# Patient Record
Sex: FEMALE | Race: OTHER | NOT HISPANIC OR LATINO | Employment: OTHER | URBAN - METROPOLITAN AREA
[De-identification: names, ages, dates, MRNs, and addresses within clinical notes are randomized per-mention and may not be internally consistent; named-entity substitution may affect disease eponyms.]

---

## 2023-10-06 ENCOUNTER — HOSPITAL ENCOUNTER (EMERGENCY)
Facility: HOSPITAL | Age: 88
Discharge: HOME | End: 2023-10-07
Attending: EMERGENCY MEDICINE
Payer: MEDICARE

## 2023-10-06 ENCOUNTER — APPOINTMENT (OUTPATIENT)
Dept: RADIOLOGY | Facility: HOSPITAL | Age: 88
End: 2023-10-06
Payer: MEDICARE

## 2023-10-06 DIAGNOSIS — R11.2 NAUSEA AND VOMITING, UNSPECIFIED VOMITING TYPE: Primary | ICD-10-CM

## 2023-10-06 DIAGNOSIS — K40.90 INGUINAL HERNIA OF LEFT SIDE WITHOUT OBSTRUCTION OR GANGRENE: ICD-10-CM

## 2023-10-06 LAB
BASOPHILS # BLD AUTO: 0.02 X10*3/UL (ref 0–0.1)
BASOPHILS NFR BLD AUTO: 0.2 %
EOSINOPHIL # BLD AUTO: 0.2 X10*3/UL (ref 0–0.4)
EOSINOPHIL NFR BLD AUTO: 1.8 %
ERYTHROCYTE [DISTWIDTH] IN BLOOD BY AUTOMATED COUNT: 13.3 % (ref 11.5–14.5)
HCT VFR BLD AUTO: 32.2 % (ref 36–46)
HGB BLD-MCNC: 10 G/DL (ref 12–16)
IMM GRANULOCYTES # BLD AUTO: 0.04 X10*3/UL (ref 0–0.5)
IMM GRANULOCYTES NFR BLD AUTO: 0.4 % (ref 0–0.9)
LYMPHOCYTES # BLD AUTO: 0.8 X10*3/UL (ref 0.8–3)
LYMPHOCYTES NFR BLD AUTO: 7.3 %
MCH RBC QN AUTO: 28.2 PG (ref 26–34)
MCHC RBC AUTO-ENTMCNC: 31.1 G/DL (ref 32–36)
MCV RBC AUTO: 91 FL (ref 80–100)
MONOCYTES # BLD AUTO: 0.74 X10*3/UL (ref 0.05–0.8)
MONOCYTES NFR BLD AUTO: 6.7 %
NEUTROPHILS # BLD AUTO: 9.23 X10*3/UL (ref 1.6–5.5)
NEUTROPHILS NFR BLD AUTO: 83.6 %
NRBC BLD-RTO: 0 /100 WBCS (ref 0–0)
PLATELET # BLD AUTO: 222 X10*3/UL (ref 150–450)
PMV BLD AUTO: 10.9 FL (ref 7.5–11.5)
RBC # BLD AUTO: 3.55 X10*6/UL (ref 4–5.2)
WBC # BLD AUTO: 11 X10*3/UL (ref 4.4–11.3)

## 2023-10-06 PROCEDURE — 83690 ASSAY OF LIPASE: CPT | Performed by: EMERGENCY MEDICINE

## 2023-10-06 PROCEDURE — 99284 EMERGENCY DEPT VISIT MOD MDM: CPT | Performed by: EMERGENCY MEDICINE

## 2023-10-06 PROCEDURE — 99285 EMERGENCY DEPT VISIT HI MDM: CPT | Performed by: EMERGENCY MEDICINE

## 2023-10-06 PROCEDURE — 96361 HYDRATE IV INFUSION ADD-ON: CPT | Performed by: EMERGENCY MEDICINE

## 2023-10-06 PROCEDURE — 36415 COLL VENOUS BLD VENIPUNCTURE: CPT | Performed by: EMERGENCY MEDICINE

## 2023-10-06 PROCEDURE — 74177 CT ABD & PELVIS W/CONTRAST: CPT | Mod: MG

## 2023-10-06 PROCEDURE — 85025 COMPLETE CBC W/AUTO DIFF WBC: CPT | Performed by: EMERGENCY MEDICINE

## 2023-10-06 PROCEDURE — 74177 CT ABD & PELVIS W/CONTRAST: CPT | Performed by: RADIOLOGY

## 2023-10-06 PROCEDURE — 83735 ASSAY OF MAGNESIUM: CPT | Performed by: EMERGENCY MEDICINE

## 2023-10-06 PROCEDURE — 84075 ASSAY ALKALINE PHOSPHATASE: CPT | Performed by: EMERGENCY MEDICINE

## 2023-10-06 PROCEDURE — 96374 THER/PROPH/DIAG INJ IV PUSH: CPT | Performed by: EMERGENCY MEDICINE

## 2023-10-06 RX ORDER — ONDANSETRON HYDROCHLORIDE 2 MG/ML
4 INJECTION, SOLUTION INTRAVENOUS ONCE
Status: COMPLETED | OUTPATIENT
Start: 2023-10-06 | End: 2023-10-07

## 2023-10-06 ASSESSMENT — PAIN SCALES - GENERAL: PAINLEVEL_OUTOF10: 3

## 2023-10-06 ASSESSMENT — LIFESTYLE VARIABLES
HAVE YOU EVER FELT YOU SHOULD CUT DOWN ON YOUR DRINKING: NO
EVER HAD A DRINK FIRST THING IN THE MORNING TO STEADY YOUR NERVES TO GET RID OF A HANGOVER: NO
EVER FELT BAD OR GUILTY ABOUT YOUR DRINKING: NO
HAVE PEOPLE ANNOYED YOU BY CRITICIZING YOUR DRINKING: NO

## 2023-10-06 ASSESSMENT — COLUMBIA-SUICIDE SEVERITY RATING SCALE - C-SSRS
1. IN THE PAST MONTH, HAVE YOU WISHED YOU WERE DEAD OR WISHED YOU COULD GO TO SLEEP AND NOT WAKE UP?: NO
2. HAVE YOU ACTUALLY HAD ANY THOUGHTS OF KILLING YOURSELF?: NO
6. HAVE YOU EVER DONE ANYTHING, STARTED TO DO ANYTHING, OR PREPARED TO DO ANYTHING TO END YOUR LIFE?: NO

## 2023-10-06 ASSESSMENT — PAIN - FUNCTIONAL ASSESSMENT: PAIN_FUNCTIONAL_ASSESSMENT: 0-10

## 2023-10-07 VITALS
HEIGHT: 63 IN | OXYGEN SATURATION: 98 % | WEIGHT: 115.3 LBS | DIASTOLIC BLOOD PRESSURE: 49 MMHG | BODY MASS INDEX: 20.43 KG/M2 | TEMPERATURE: 97.9 F | SYSTOLIC BLOOD PRESSURE: 95 MMHG | RESPIRATION RATE: 18 BRPM | HEART RATE: 87 BPM

## 2023-10-07 LAB
ALBUMIN SERPL BCP-MCNC: 3.7 G/DL (ref 3.4–5)
ALP SERPL-CCNC: 70 U/L (ref 33–136)
ALT SERPL W P-5'-P-CCNC: 9 U/L (ref 7–45)
ANION GAP SERPL CALC-SCNC: 11 MMOL/L (ref 10–20)
AST SERPL W P-5'-P-CCNC: 22 U/L (ref 9–39)
BILIRUB SERPL-MCNC: 0.3 MG/DL (ref 0–1.2)
BUN SERPL-MCNC: 40 MG/DL (ref 6–23)
CALCIUM SERPL-MCNC: 8.9 MG/DL (ref 8.6–10.3)
CHLORIDE SERPL-SCNC: 100 MMOL/L (ref 98–107)
CO2 SERPL-SCNC: 30 MMOL/L (ref 21–32)
CREAT SERPL-MCNC: 0.73 MG/DL (ref 0.5–1.05)
GFR SERPL CREATININE-BSD FRML MDRD: 78 ML/MIN/1.73M*2
GLUCOSE SERPL-MCNC: 181 MG/DL (ref 74–99)
LIPASE SERPL-CCNC: 17 U/L (ref 9–82)
MAGNESIUM SERPL-MCNC: 1.91 MG/DL (ref 1.6–2.4)
POTASSIUM SERPL-SCNC: 5 MMOL/L (ref 3.5–5.3)
PROT SERPL-MCNC: 6.3 G/DL (ref 6.4–8.2)
SODIUM SERPL-SCNC: 136 MMOL/L (ref 136–145)

## 2023-10-07 PROCEDURE — 2500000004 HC RX 250 GENERAL PHARMACY W/ HCPCS (ALT 636 FOR OP/ED): Performed by: EMERGENCY MEDICINE

## 2023-10-07 PROCEDURE — 2550000001 HC RX 255 CONTRASTS: Performed by: EMERGENCY MEDICINE

## 2023-10-07 PROCEDURE — 96374 THER/PROPH/DIAG INJ IV PUSH: CPT | Mod: 59 | Performed by: EMERGENCY MEDICINE

## 2023-10-07 PROCEDURE — 74177 CT ABD & PELVIS W/CONTRAST: CPT | Mod: MG

## 2023-10-07 PROCEDURE — 96361 HYDRATE IV INFUSION ADD-ON: CPT | Performed by: EMERGENCY MEDICINE

## 2023-10-07 PROCEDURE — 2580000001 HC RX 258 IV SOLUTIONS: Performed by: EMERGENCY MEDICINE

## 2023-10-07 RX ADMIN — ONDANSETRON 4 MG: 2 INJECTION INTRAMUSCULAR; INTRAVENOUS at 00:11

## 2023-10-07 RX ADMIN — IOHEXOL 75 ML: 350 INJECTION, SOLUTION INTRAVENOUS at 00:42

## 2023-10-07 RX ADMIN — SODIUM CHLORIDE, POTASSIUM CHLORIDE, SODIUM LACTATE AND CALCIUM CHLORIDE 1000 ML: 600; 310; 30; 20 INJECTION, SOLUTION INTRAVENOUS at 00:10

## 2023-10-07 NOTE — ED PROVIDER NOTES
HPI   Chief Complaint   Patient presents with    Nausea    Diarrhea    Vomiting       89-year-old female presenting to the emergency department with abdominal pain vomiting and diarrhea.  Symptoms started about 9 PM.  She is initially just feeling nauseous.  Then had large episode of emesis and had a bout of diarrhea at the same time.  She was having pain before this, but has since resolved.  There is no blood in the vomit or coffee-ground emesis.  No blood in stools or black or tarry stools.  No fevers or chills.  No chest pain or trouble breathing.  No lightheadedness or syncope.  No dysuria hematuria urinary frequency.                          [place] Coma Scale Score: 10                  Patient History   Past Medical History:   Diagnosis Date    Personal history of other diseases of the circulatory system     History of hypertension    Personal history of other diseases of the digestive system     History of gastroesophageal reflux (GERD)    Personal history of other endocrine, nutritional and metabolic disease     History of high cholesterol     Past Surgical History:   Procedure Laterality Date    APPENDECTOMY  04/10/2018    Appendectomy    GALLBLADDER SURGERY  04/10/2018    Gallbladder Surgery    TOTAL KNEE ARTHROPLASTY  04/10/2018    Knee Replacement     No family history on file.  Social History     Tobacco Use    Smoking status: Not on file    Smokeless tobacco: Not on file   Substance Use Topics    Alcohol use: Not on file    Drug use: Not on file       Physical Exam   ED Triage Vitals [10/06/23 2218]   Temp Heart Rate Resp BP   36.6 °C (97.9 °F) 92 18 119/60      SpO2 Temp src Heart Rate Source Patient Position   96 % -- -- --      BP Location FiO2 (%)     -- --       Physical Exam  Vitals and nursing note reviewed.   Constitutional:       General: She is not in acute distress.     Appearance: Normal appearance. She is not ill-appearing.   HENT:      Head: Normocephalic and atraumatic.      Nose: Nose  normal.      Mouth/Throat:      Mouth: Mucous membranes are moist.   Eyes:      Extraocular Movements: Extraocular movements intact.      Conjunctiva/sclera: Conjunctivae normal.      Pupils: Pupils are equal, round, and reactive to light.   Cardiovascular:      Rate and Rhythm: Normal rate and regular rhythm.      Heart sounds: Normal heart sounds.   Pulmonary:      Effort: Pulmonary effort is normal.      Breath sounds: Normal breath sounds.   Abdominal:      General: Abdomen is flat. Bowel sounds are normal. There is no distension.      Palpations: Abdomen is soft.      Tenderness: There is no abdominal tenderness. There is no right CVA tenderness, left CVA tenderness, guarding or rebound.      Hernia: A hernia (left inguinal, reducible) is present.   Musculoskeletal:         General: Normal range of motion.      Cervical back: Normal range of motion and neck supple.   Skin:     General: Skin is warm and dry.   Neurological:      General: No focal deficit present.      Mental Status: She is alert and oriented to person, place, and time.   Psychiatric:         Mood and Affect: Mood normal.         Behavior: Behavior normal.         ED Course & MDM   Diagnoses as of 10/07/23 0235   Nausea and vomiting, unspecified vomiting type   Inguinal hernia of left side without obstruction or gangrene       Medical Decision Making  89-year-old female presenting to the emergency department with abdominal pain vomiting and diarrhea.  She had 1 episode of vomiting and diarrhea.  Abdominal pain resolved after the episode of vomiting and diarrhea.  She is afebrile hemodynamically stable here in the emergency department.  She is not having any pain and is in no distress.  Abdomen is soft and nontender.  However, given her age and unreliable abdominal exam we will plan to obtain blood work and get a CT scan of the abdomen pelvis.    Labs all reviewed, no leukocytosis.  Other labs reviewed.  CT scan shows left-sided inguinal hernia  without any evidence of obstruction or other acute intra-abdominal pathology.  This is present on examination, and is easily reducible.  No concern for incarcerated or strangulated hernia.    Patient still feeling well.  She was given oral challenge which she tolerated without any difficulty.  At this time, I feel patient is appropriate for discharge and outpatient management.  She is given general surgery to follow-up with respect to her inguinal hernia.  She is given strict return precautions.  Patient and daughter understand agree with stated plan and she is discharged home at this time.        Procedure  Procedures     Sergio Frazier, DO  10/07/23 0648

## 2023-10-16 ENCOUNTER — HOSPITAL ENCOUNTER (OUTPATIENT)
Dept: CARDIOLOGY | Facility: HOSPITAL | Age: 88
Discharge: HOME | End: 2023-10-16
Payer: MEDICARE

## 2023-10-16 LAB
ATRIAL RATE: 90 BPM
P AXIS: 69 DEGREES
P OFFSET: 186 MS
P ONSET: 150 MS
PR INTERVAL: 156 MS
Q ONSET: 228 MS
QRS COUNT: 15 BEATS
QRS DURATION: 68 MS
QT INTERVAL: 372 MS
QTC CALCULATION(BAZETT): 455 MS
QTC FREDERICIA: 425 MS
R AXIS: 9 DEGREES
T AXIS: 41 DEGREES
T OFFSET: 414 MS
VENTRICULAR RATE: 90 BPM

## 2023-10-16 PROCEDURE — 93005 ELECTROCARDIOGRAM TRACING: CPT

## 2023-10-18 ENCOUNTER — APPOINTMENT (OUTPATIENT)
Dept: RADIOLOGY | Facility: HOSPITAL | Age: 88
End: 2023-10-18
Payer: MEDICARE

## 2023-10-18 ENCOUNTER — HOSPITAL ENCOUNTER (EMERGENCY)
Facility: HOSPITAL | Age: 88
Discharge: HOME | End: 2023-10-19
Attending: EMERGENCY MEDICINE
Payer: MEDICARE

## 2023-10-18 DIAGNOSIS — R10.84 GENERALIZED ABDOMINAL PAIN: Primary | ICD-10-CM

## 2023-10-18 LAB
ALBUMIN SERPL BCP-MCNC: 4 G/DL (ref 3.4–5)
ALP SERPL-CCNC: 85 U/L (ref 33–136)
ALT SERPL W P-5'-P-CCNC: 10 U/L (ref 7–45)
ANION GAP SERPL CALC-SCNC: 11 MMOL/L (ref 10–20)
APPEARANCE UR: CLEAR
AST SERPL W P-5'-P-CCNC: 16 U/L (ref 9–39)
BASOPHILS # BLD AUTO: 0.01 X10*3/UL (ref 0–0.1)
BASOPHILS NFR BLD AUTO: 0.1 %
BILIRUB SERPL-MCNC: 0.2 MG/DL (ref 0–1.2)
BILIRUB UR STRIP.AUTO-MCNC: NEGATIVE MG/DL
BUN SERPL-MCNC: 36 MG/DL (ref 6–23)
CALCIUM SERPL-MCNC: 8.8 MG/DL (ref 8.6–10.3)
CHLORIDE SERPL-SCNC: 101 MMOL/L (ref 98–107)
CO2 SERPL-SCNC: 27 MMOL/L (ref 21–32)
COLOR UR: YELLOW
CREAT SERPL-MCNC: 0.81 MG/DL (ref 0.5–1.05)
EOSINOPHIL # BLD AUTO: 0.01 X10*3/UL (ref 0–0.4)
EOSINOPHIL NFR BLD AUTO: 0.1 %
ERYTHROCYTE [DISTWIDTH] IN BLOOD BY AUTOMATED COUNT: 13.2 % (ref 11.5–14.5)
GFR SERPL CREATININE-BSD FRML MDRD: 69 ML/MIN/1.73M*2
GLUCOSE SERPL-MCNC: 206 MG/DL (ref 74–99)
GLUCOSE UR STRIP.AUTO-MCNC: NEGATIVE MG/DL
HCT VFR BLD AUTO: 34.3 % (ref 36–46)
HGB BLD-MCNC: 10.7 G/DL (ref 12–16)
HOLD SPECIMEN: NORMAL
IMM GRANULOCYTES # BLD AUTO: 0.03 X10*3/UL (ref 0–0.5)
IMM GRANULOCYTES NFR BLD AUTO: 0.3 % (ref 0–0.9)
KETONES UR STRIP.AUTO-MCNC: NEGATIVE MG/DL
LEUKOCYTE ESTERASE UR QL STRIP.AUTO: NEGATIVE
LIPASE SERPL-CCNC: 14 U/L (ref 9–82)
LYMPHOCYTES # BLD AUTO: 0.48 X10*3/UL (ref 0.8–3)
LYMPHOCYTES NFR BLD AUTO: 4.3 %
MCH RBC QN AUTO: 27.6 PG (ref 26–34)
MCHC RBC AUTO-ENTMCNC: 31.2 G/DL (ref 32–36)
MCV RBC AUTO: 89 FL (ref 80–100)
MONOCYTES # BLD AUTO: 0.62 X10*3/UL (ref 0.05–0.8)
MONOCYTES NFR BLD AUTO: 5.5 %
NEUTROPHILS # BLD AUTO: 10.12 X10*3/UL (ref 1.6–5.5)
NEUTROPHILS NFR BLD AUTO: 89.7 %
NITRITE UR QL STRIP.AUTO: NEGATIVE
NRBC BLD-RTO: 0 /100 WBCS (ref 0–0)
PH UR STRIP.AUTO: 5 [PH]
PLATELET # BLD AUTO: 225 X10*3/UL (ref 150–450)
PMV BLD AUTO: 11 FL (ref 7.5–11.5)
POTASSIUM SERPL-SCNC: 4.4 MMOL/L (ref 3.5–5.3)
PROT SERPL-MCNC: 6.5 G/DL (ref 6.4–8.2)
PROT UR STRIP.AUTO-MCNC: NEGATIVE MG/DL
RBC # BLD AUTO: 3.87 X10*6/UL (ref 4–5.2)
RBC # UR STRIP.AUTO: NEGATIVE /UL
SODIUM SERPL-SCNC: 135 MMOL/L (ref 136–145)
SP GR UR STRIP.AUTO: 1.01
UROBILINOGEN UR STRIP.AUTO-MCNC: <2 MG/DL
WBC # BLD AUTO: 11.3 X10*3/UL (ref 4.4–11.3)

## 2023-10-18 PROCEDURE — 85025 COMPLETE CBC W/AUTO DIFF WBC: CPT | Performed by: EMERGENCY MEDICINE

## 2023-10-18 PROCEDURE — 99285 EMERGENCY DEPT VISIT HI MDM: CPT | Performed by: STUDENT IN AN ORGANIZED HEALTH CARE EDUCATION/TRAINING PROGRAM

## 2023-10-18 PROCEDURE — 2550000001 HC RX 255 CONTRASTS: Performed by: EMERGENCY MEDICINE

## 2023-10-18 PROCEDURE — 81003 URINALYSIS AUTO W/O SCOPE: CPT | Performed by: EMERGENCY MEDICINE

## 2023-10-18 PROCEDURE — 74177 CT ABD & PELVIS W/CONTRAST: CPT | Performed by: RADIOLOGY

## 2023-10-18 PROCEDURE — 99284 EMERGENCY DEPT VISIT MOD MDM: CPT | Performed by: EMERGENCY MEDICINE

## 2023-10-18 PROCEDURE — 99285 EMERGENCY DEPT VISIT HI MDM: CPT | Mod: 25

## 2023-10-18 PROCEDURE — 2500000004 HC RX 250 GENERAL PHARMACY W/ HCPCS (ALT 636 FOR OP/ED)

## 2023-10-18 PROCEDURE — 74022 RADEX COMPL AQT ABD SERIES: CPT | Performed by: RADIOLOGY

## 2023-10-18 PROCEDURE — 74177 CT ABD & PELVIS W/CONTRAST: CPT | Mod: MG

## 2023-10-18 PROCEDURE — 96374 THER/PROPH/DIAG INJ IV PUSH: CPT | Mod: 59

## 2023-10-18 PROCEDURE — 74022 RADEX COMPL AQT ABD SERIES: CPT | Mod: FY

## 2023-10-18 PROCEDURE — 83690 ASSAY OF LIPASE: CPT | Performed by: EMERGENCY MEDICINE

## 2023-10-18 PROCEDURE — 84075 ASSAY ALKALINE PHOSPHATASE: CPT | Performed by: EMERGENCY MEDICINE

## 2023-10-18 PROCEDURE — 36415 COLL VENOUS BLD VENIPUNCTURE: CPT | Performed by: EMERGENCY MEDICINE

## 2023-10-18 RX ORDER — ONDANSETRON HYDROCHLORIDE 2 MG/ML
4 INJECTION, SOLUTION INTRAVENOUS ONCE
Status: COMPLETED | OUTPATIENT
Start: 2023-10-18 | End: 2023-10-18

## 2023-10-18 RX ORDER — ONDANSETRON HYDROCHLORIDE 2 MG/ML
INJECTION, SOLUTION INTRAVENOUS
Status: COMPLETED
Start: 2023-10-18 | End: 2023-10-18

## 2023-10-18 RX ADMIN — ONDANSETRON 4 MG: 2 INJECTION INTRAMUSCULAR; INTRAVENOUS at 22:38

## 2023-10-18 RX ADMIN — IOHEXOL 75 ML: 350 INJECTION, SOLUTION INTRAVENOUS at 22:54

## 2023-10-18 RX ADMIN — ONDANSETRON HYDROCHLORIDE 4 MG: 2 INJECTION, SOLUTION INTRAVENOUS at 22:38

## 2023-10-18 ASSESSMENT — LIFESTYLE VARIABLES
HAVE PEOPLE ANNOYED YOU BY CRITICIZING YOUR DRINKING: NO
HAVE YOU EVER FELT YOU SHOULD CUT DOWN ON YOUR DRINKING: NO
EVER FELT BAD OR GUILTY ABOUT YOUR DRINKING: NO
EVER HAD A DRINK FIRST THING IN THE MORNING TO STEADY YOUR NERVES TO GET RID OF A HANGOVER: NO

## 2023-10-18 ASSESSMENT — PAIN DESCRIPTION - ONSET: ONSET: ONGOING

## 2023-10-18 ASSESSMENT — PAIN DESCRIPTION - FREQUENCY: FREQUENCY: CONSTANT/CONTINUOUS

## 2023-10-18 ASSESSMENT — PAIN DESCRIPTION - LOCATION: LOCATION: ABDOMEN

## 2023-10-18 ASSESSMENT — PAIN - FUNCTIONAL ASSESSMENT: PAIN_FUNCTIONAL_ASSESSMENT: 0-10

## 2023-10-18 ASSESSMENT — COLUMBIA-SUICIDE SEVERITY RATING SCALE - C-SSRS
6. HAVE YOU EVER DONE ANYTHING, STARTED TO DO ANYTHING, OR PREPARED TO DO ANYTHING TO END YOUR LIFE?: NO
1. IN THE PAST MONTH, HAVE YOU WISHED YOU WERE DEAD OR WISHED YOU COULD GO TO SLEEP AND NOT WAKE UP?: NO
2. HAVE YOU ACTUALLY HAD ANY THOUGHTS OF KILLING YOURSELF?: NO

## 2023-10-18 ASSESSMENT — PAIN DESCRIPTION - PAIN TYPE: TYPE: ACUTE PAIN

## 2023-10-18 ASSESSMENT — PAIN DESCRIPTION - ORIENTATION: ORIENTATION: LOWER

## 2023-10-18 ASSESSMENT — PAIN DESCRIPTION - DESCRIPTORS
DESCRIPTORS: TENDER
DESCRIPTORS: DISCOMFORT

## 2023-10-18 ASSESSMENT — PAIN SCALES - GENERAL: PAINLEVEL_OUTOF10: 7

## 2023-10-19 ENCOUNTER — HOSPITAL ENCOUNTER (OUTPATIENT)
Dept: CARDIOLOGY | Facility: HOSPITAL | Age: 88
Discharge: HOME | End: 2023-10-19
Payer: MEDICARE

## 2023-10-19 VITALS
DIASTOLIC BLOOD PRESSURE: 55 MMHG | SYSTOLIC BLOOD PRESSURE: 113 MMHG | RESPIRATION RATE: 16 BRPM | OXYGEN SATURATION: 95 % | WEIGHT: 130 LBS | TEMPERATURE: 98.1 F | BODY MASS INDEX: 25.52 KG/M2 | HEIGHT: 60 IN | HEART RATE: 74 BPM

## 2023-10-19 LAB
ATRIAL RATE: 93 BPM
P AXIS: 79 DEGREES
P OFFSET: 181 MS
P ONSET: 144 MS
PR INTERVAL: 166 MS
Q ONSET: 227 MS
QRS COUNT: 16 BEATS
QRS DURATION: 72 MS
QT INTERVAL: 382 MS
QTC CALCULATION(BAZETT): 474 MS
QTC FREDERICIA: 442 MS
R AXIS: 3 DEGREES
T AXIS: 28 DEGREES
T OFFSET: 418 MS
VENTRICULAR RATE: 93 BPM

## 2023-10-19 PROCEDURE — 96375 TX/PRO/DX INJ NEW DRUG ADDON: CPT

## 2023-10-19 PROCEDURE — 2500000004 HC RX 250 GENERAL PHARMACY W/ HCPCS (ALT 636 FOR OP/ED)

## 2023-10-19 PROCEDURE — 93010 ELECTROCARDIOGRAM REPORT: CPT | Performed by: INTERNAL MEDICINE

## 2023-10-19 PROCEDURE — 93005 ELECTROCARDIOGRAM TRACING: CPT

## 2023-10-19 RX ORDER — MORPHINE SULFATE 2 MG/ML
2 INJECTION, SOLUTION INTRAMUSCULAR; INTRAVENOUS ONCE
Status: COMPLETED | OUTPATIENT
Start: 2023-10-19 | End: 2023-10-19

## 2023-10-19 RX ADMIN — MORPHINE SULFATE 2 MG: 2 INJECTION, SOLUTION INTRAMUSCULAR; INTRAVENOUS at 00:56

## 2023-10-19 ASSESSMENT — PAIN SCALES - GENERAL
PAINLEVEL_OUTOF10: 4
PAINLEVEL_OUTOF10: 6

## 2023-10-19 NOTE — PROGRESS NOTES
Natacha Ragsdale was handed off to me from the previous provided, Dr. Derek Ortez, at end of shift.  Please see previous provider's note for detailed H&P, MDM and prior ED course.     Of Note: Patient is non-english speaking, daughter present at bedside is assisting with interpretation and translation.     In brief, Natacha Ragsdale is an 89 y.o. female presenting from SNF with acute lower abdominal pain and multiple episodes of liquid, non-bloody diarrhea since this morning. Prior to symptom onset, patient had been constipated and unable to defect for the last 5 days. According to triage note was given a stool softener by NH staff. She was still passing flatus. Her history of liquids stool without solid BM preceded by period of constipation was most concerning for acute obstruction. Initial physical exam revealed TTP over RLQ and LLQ as well as presence of external hemorrhoids without impacted stool within rectal vault. She was medicated with zofran 4mg IV for symptomatic support. Diagnostic work-up included basic belly labs, negative UA and an acute abdominal XR series which revealed air-filled dilated small bowel throughout with nonspecific gas patterns. Labs were significant for glucose 206 and corrected sodium of 137 and stable anemia. Lipase was normal. EKG obtained at 2008 was nonischemic with sinus rhythm at 93bpm, normal LA interval and QTc.   At time of sign-out, results of CT abdomen pelvis are still pending. However, given her multiple risk factors and clinical presentation, previous provider did anticipate/believe that patient would benefit from in-patient/hospital admission.    Whilst under my care, patient has been tolerating PO intake and remained hemodynamically stable. On my reassessment, she was still experiencing bilateral lower abdominal pain and cramping. No longer nauseated. Denied any other associated systemic symptoms including fever, chills, chest pain/pressure, palpitations, dyspnea,  urinary symptoms. She has not had any further episodes of diarrhea but denies history of melena or BRBPR. Repeat abdominal exam was benign with no distention, rigidity or abnormal bowel sounds. She did continue to exhibit mild tenderness over diffuse lower abdomen but nothing localized. She was therefore given 2 mg Morphine with complete resolution of her abdominal pain.  CT scan of abdomen and pelvis showed moderate stool burden in the colon suggestive of constipation as well as stool ball within rectum with wall thickening and adjacent stranding suggestive for stercoral colitis. No evidence of acute obstruction, dilated bowel loops or presence of free fluid. Final radiology report noted left inguinal hernia containing small bowel loop without evidence of obstruction. There was also an ascending thoracic aorta aneurysm measures up to 4.1 cm found incidentally on CT imaging.    Patient was informed about the above CT imaging results and voiced understanding. She is feeling better and daughter feels comfortable with patient being discharged back to her SNF. Given that patient has remain stable, abdomen benign and she is continuing to have bowel movements, I believe she is appropriate for safe discharge home. She was advised to follow-up with her PCP in 2-3 days and increased daily fluid intake. Nursing home will continue bowel regiment to prevent further consitpation. ED return precautions were discussed in detail.    ED Course as of 10/19/23 0259   Wed Oct 18, 2023   2050 Lipase  Lipase is normal [GB]   2050 Comprehensive Metabolic Panel(!)  Comprehensive is unremarkable other than a slightly elevated glucose of 206 but normal BUN and creatinine along with liver function test. [GB]   2051 CBC with Differential(!)  CBC shows a normal white blood cell count low suspicion for infection.  Stable H&H for anemia at 10 and 34. [GB]   2051 ECG 12 lead  EKG at 2008 reveals a sinus rhythm at 93 bpm.  The MA interval and QTc  of are both normal.  Low voltage criteria is present.  No acute ST segment elevations. [GB]   Thu Oct 19, 2023   0032 2 morphine given for pain. CT abd/pelvis was performed, dispo will ultimately depend on results however given multiple risk factors and clinical presentation, prior Dr. Ortez does feel patient would be appropriate for admission. [MV]      ED Course User Index  [GB] Lui Zapata DO  [MV] Malini Ty PA-C         Diagnoses as of 10/19/23 0259   Generalized abdominal pain       Diagnosis: Generalized abdominal pain  Pt Disposition: Discharged in stable condition.

## 2023-10-19 NOTE — ED PROVIDER NOTES
HPI   Chief Complaint   Patient presents with    Abdominal Pain     Pt was constipated for 5 days, was given stool softeners at the SNF and now pt has diarrhea and lower abd pain        89-year-old female presenting with constipation of 5-day onset she is accompanied by her daughter who is also acting as .  Patient initially felt constipation 5 days ago and has not had a bowel movement until today with multiple liquid stools, but no solid BM.  At 4 AM today she has been starting to have lower abdominal pain.    Denies fever, chills, vomiting, chest pain, palpitations, shortness of breath, cough, abdominal pain, urinary changes, hematuria, dysuria, changes in stool, hematochezia, melena.                            [place] Coma Scale Score: 14                  Patient History   Past Medical History:   Diagnosis Date    Personal history of other diseases of the circulatory system     History of hypertension    Personal history of other diseases of the digestive system     History of gastroesophageal reflux (GERD)    Personal history of other endocrine, nutritional and metabolic disease     History of high cholesterol     Past Surgical History:   Procedure Laterality Date    APPENDECTOMY  04/10/2018    Appendectomy    GALLBLADDER SURGERY  04/10/2018    Gallbladder Surgery    TOTAL KNEE ARTHROPLASTY  04/10/2018    Knee Replacement     No family history on file.  Social History     Tobacco Use    Smoking status: Not on file    Smokeless tobacco: Not on file   Substance Use Topics    Alcohol use: Not on file    Drug use: Not on file       Physical Exam   ED Triage Vitals [10/18/23 1924]   Temp Heart Rate Resp BP   36.4 °C (97.5 °F) 94 18 124/60      SpO2 Temp Source Heart Rate Source Patient Position   97 % Temporal Monitor Sitting      BP Location FiO2 (%)     Right arm --       Physical Exam  Vitals reviewed.   Constitutional:       General: She is not in acute distress.     Appearance: Normal appearance. She  is not ill-appearing, toxic-appearing or diaphoretic.   HENT:      Head: Normocephalic and atraumatic.      Right Ear: External ear normal.      Left Ear: External ear normal.      Nose: Nose normal.      Mouth/Throat:      Mouth: Mucous membranes are moist.      Pharynx: Oropharynx is clear.   Eyes:      Extraocular Movements: Extraocular movements intact.      Pupils: Pupils are equal, round, and reactive to light.   Cardiovascular:      Rate and Rhythm: Normal rate and regular rhythm.      Pulses: Normal pulses.      Heart sounds: Normal heart sounds. No murmur heard.     No friction rub. No gallop.   Pulmonary:      Effort: Pulmonary effort is normal. No respiratory distress.      Breath sounds: Normal breath sounds. No wheezing, rhonchi or rales.   Abdominal:      General: Abdomen is flat. Bowel sounds are normal. There is no distension.      Palpations: Abdomen is soft. There is no mass.      Tenderness: There is abdominal tenderness.      Hernia: No hernia is present.      Comments: Tenderness in RLQ and LLQ on palpation.   Genitourinary:     Rectum: External hemorrhoid present. No mass or tenderness. Normal anal tone.      Comments: Rectal exam: Anal sphincter tone patent, rectal vault without stool ball palpable on digital exam.  Liquid stool present after exam.  Musculoskeletal:         General: Normal range of motion.      Cervical back: Normal range of motion and neck supple.   Skin:     General: Skin is warm and dry.      Capillary Refill: Capillary refill takes less than 2 seconds.   Neurological:      General: No focal deficit present.      Mental Status: She is alert and oriented to person, place, and time. Mental status is at baseline.   Psychiatric:         Mood and Affect: Mood normal.         Behavior: Behavior normal.         ED Course & MDM   ED Course as of 11/03/23 0941   Wed Oct 18, 2023   2050 Lipase  Lipase is normal [GB]   2050 Comprehensive Metabolic Panel(!)  Comprehensive is  unremarkable other than a slightly elevated glucose of 206 but normal BUN and creatinine along with liver function test. [GB]   2051 CBC with Differential(!)  CBC shows a normal white blood cell count low suspicion for infection.  Stable H&H for anemia at 10 and 34. [GB]   2051 ECG 12 lead  EKG at 2008 reveals a sinus rhythm at 93 bpm.  The OK interval and QTc of are both normal.  Low voltage criteria is present.  No acute ST segment elevations. [GB]   u Oct 19, 2023   0032 2 morphine given for pain. CT abd/pelvis was performed, dispo will ultimately depend on results however given multiple risk factors and clinical presentation, prior Dr. Ortez does feel patient would be appropriate for admission. [MV]      ED Course User Index  [GB] Lui Zapata DO  [MV] Malini Ty PA-C         Diagnoses as of 11/03/23 0941   Generalized abdominal pain       Medical Decision Making  89-year-old female presenting with constipation concerning for obstruction given history of liquid stools without solid bowel movement.  Work-up significant for Negative UA, lipase 14, glucose 206, sodium 137 when corrected for glucose, rest of CMP unremarkable, WBC 11.3, hemoglobin 10.7, platelets 225, acute abdominal series significant for air-filled dilated small bowel throughout abdomen and nonspecific all past pattern and no acute cardiopulmonary process.  X-ray results concerning CT obtained, pending read from radiology and patient transferred to physician assistant Malini Ty.        Procedure  Procedures     Kyle Henry DO  11/03/23 0941

## 2023-12-02 ENCOUNTER — HOSPITAL ENCOUNTER (OUTPATIENT)
Facility: HOSPITAL | Age: 88
Setting detail: OBSERVATION
Discharge: NURSING, REHAB OR LONG-TERM CARE | End: 2023-12-04
Attending: STUDENT IN AN ORGANIZED HEALTH CARE EDUCATION/TRAINING PROGRAM | Admitting: OBSTETRICS & GYNECOLOGY
Payer: MEDICARE

## 2023-12-02 ENCOUNTER — APPOINTMENT (OUTPATIENT)
Dept: RADIOLOGY | Facility: HOSPITAL | Age: 88
End: 2023-12-02
Payer: MEDICARE

## 2023-12-02 DIAGNOSIS — N76.4 CELLULITIS OF FEMALE GENITALIA: ICD-10-CM

## 2023-12-02 DIAGNOSIS — N30.00 ACUTE CYSTITIS WITHOUT HEMATURIA: Primary | ICD-10-CM

## 2023-12-02 DIAGNOSIS — N76.2 VULVAR CELLULITIS: ICD-10-CM

## 2023-12-02 LAB
ALBUMIN SERPL BCP-MCNC: 4.1 G/DL (ref 3.4–5)
ALP SERPL-CCNC: 65 U/L (ref 33–136)
ALT SERPL W P-5'-P-CCNC: 11 U/L (ref 7–45)
ANION GAP SERPL CALC-SCNC: 13 MMOL/L (ref 10–20)
APPEARANCE UR: CLEAR
AST SERPL W P-5'-P-CCNC: 27 U/L (ref 9–39)
BACTERIA #/AREA URNS AUTO: ABNORMAL /HPF
BASOPHILS # BLD AUTO: 0.02 X10*3/UL (ref 0–0.1)
BASOPHILS NFR BLD AUTO: 0.2 %
BILIRUB SERPL-MCNC: 0.4 MG/DL (ref 0–1.2)
BILIRUB UR STRIP.AUTO-MCNC: NEGATIVE MG/DL
BUN SERPL-MCNC: 32 MG/DL (ref 6–23)
CALCIUM SERPL-MCNC: 9.2 MG/DL (ref 8.6–10.3)
CHLORIDE SERPL-SCNC: 99 MMOL/L (ref 98–107)
CO2 SERPL-SCNC: 27 MMOL/L (ref 21–32)
COLOR UR: YELLOW
CREAT SERPL-MCNC: 0.8 MG/DL (ref 0.5–1.05)
EOSINOPHIL # BLD AUTO: 0.06 X10*3/UL (ref 0–0.4)
EOSINOPHIL NFR BLD AUTO: 0.5 %
ERYTHROCYTE [DISTWIDTH] IN BLOOD BY AUTOMATED COUNT: 14.5 % (ref 11.5–14.5)
GFR SERPL CREATININE-BSD FRML MDRD: 70 ML/MIN/1.73M*2
GLUCOSE SERPL-MCNC: 146 MG/DL (ref 74–99)
GLUCOSE UR STRIP.AUTO-MCNC: NEGATIVE MG/DL
HCT VFR BLD AUTO: 34 % (ref 36–46)
HGB BLD-MCNC: 10.3 G/DL (ref 12–16)
HOLD SPECIMEN: NORMAL
IMM GRANULOCYTES # BLD AUTO: 0.05 X10*3/UL (ref 0–0.5)
IMM GRANULOCYTES NFR BLD AUTO: 0.4 % (ref 0–0.9)
KETONES UR STRIP.AUTO-MCNC: NEGATIVE MG/DL
LACTATE SERPL-SCNC: 1.9 MMOL/L (ref 0.4–2)
LEUKOCYTE ESTERASE UR QL STRIP.AUTO: ABNORMAL
LIPASE SERPL-CCNC: 14 U/L (ref 9–82)
LYMPHOCYTES # BLD AUTO: 1.48 X10*3/UL (ref 0.8–3)
LYMPHOCYTES NFR BLD AUTO: 11.2 %
MAGNESIUM SERPL-MCNC: 2.23 MG/DL (ref 1.6–2.4)
MCH RBC QN AUTO: 25.2 PG (ref 26–34)
MCHC RBC AUTO-ENTMCNC: 30.3 G/DL (ref 32–36)
MCV RBC AUTO: 83 FL (ref 80–100)
MONOCYTES # BLD AUTO: 1.02 X10*3/UL (ref 0.05–0.8)
MONOCYTES NFR BLD AUTO: 7.7 %
NEUTROPHILS # BLD AUTO: 10.62 X10*3/UL (ref 1.6–5.5)
NEUTROPHILS NFR BLD AUTO: 80 %
NITRITE UR QL STRIP.AUTO: POSITIVE
NRBC BLD-RTO: 0 /100 WBCS (ref 0–0)
PH UR STRIP.AUTO: 5 [PH]
PHOSPHATE SERPL-MCNC: 4.4 MG/DL (ref 2.5–4.9)
PLATELET # BLD AUTO: 229 X10*3/UL (ref 150–450)
POTASSIUM SERPL-SCNC: 5.4 MMOL/L (ref 3.5–5.3)
PROT SERPL-MCNC: 7.3 G/DL (ref 6.4–8.2)
PROT UR STRIP.AUTO-MCNC: NEGATIVE MG/DL
RBC # BLD AUTO: 4.08 X10*6/UL (ref 4–5.2)
RBC # UR STRIP.AUTO: ABNORMAL /UL
RBC #/AREA URNS AUTO: ABNORMAL /HPF
SODIUM SERPL-SCNC: 134 MMOL/L (ref 136–145)
SP GR UR STRIP.AUTO: 1.01
UROBILINOGEN UR STRIP.AUTO-MCNC: <2 MG/DL
WBC # BLD AUTO: 13.3 X10*3/UL (ref 4.4–11.3)
WBC #/AREA URNS AUTO: ABNORMAL /HPF

## 2023-12-02 PROCEDURE — 84100 ASSAY OF PHOSPHORUS: CPT

## 2023-12-02 PROCEDURE — 99285 EMERGENCY DEPT VISIT HI MDM: CPT | Performed by: STUDENT IN AN ORGANIZED HEALTH CARE EDUCATION/TRAINING PROGRAM

## 2023-12-02 PROCEDURE — 94760 N-INVAS EAR/PLS OXIMETRY 1: CPT

## 2023-12-02 PROCEDURE — 96361 HYDRATE IV INFUSION ADD-ON: CPT

## 2023-12-02 PROCEDURE — 2500000004 HC RX 250 GENERAL PHARMACY W/ HCPCS (ALT 636 FOR OP/ED)

## 2023-12-02 PROCEDURE — 74177 CT ABD & PELVIS W/CONTRAST: CPT

## 2023-12-02 PROCEDURE — 85025 COMPLETE CBC W/AUTO DIFF WBC: CPT

## 2023-12-02 PROCEDURE — 36415 COLL VENOUS BLD VENIPUNCTURE: CPT

## 2023-12-02 PROCEDURE — 81001 URINALYSIS AUTO W/SCOPE: CPT

## 2023-12-02 PROCEDURE — 80053 COMPREHEN METABOLIC PANEL: CPT

## 2023-12-02 PROCEDURE — 74177 CT ABD & PELVIS W/CONTRAST: CPT | Mod: FOREIGN READ | Performed by: RADIOLOGY

## 2023-12-02 PROCEDURE — 83735 ASSAY OF MAGNESIUM: CPT

## 2023-12-02 PROCEDURE — 83690 ASSAY OF LIPASE: CPT

## 2023-12-02 PROCEDURE — 83605 ASSAY OF LACTIC ACID: CPT

## 2023-12-02 PROCEDURE — 87086 URINE CULTURE/COLONY COUNT: CPT | Mod: STJLAB

## 2023-12-02 PROCEDURE — 2550000001 HC RX 255 CONTRASTS: Performed by: STUDENT IN AN ORGANIZED HEALTH CARE EDUCATION/TRAINING PROGRAM

## 2023-12-02 RX ORDER — DIPHENHYDRAMINE HYDROCHLORIDE 50 MG/ML
25 INJECTION INTRAMUSCULAR; INTRAVENOUS ONCE
Status: COMPLETED | OUTPATIENT
Start: 2023-12-02 | End: 2023-12-02

## 2023-12-02 RX ORDER — CEFTRIAXONE 1 G/50ML
1 INJECTION, SOLUTION INTRAVENOUS ONCE
Status: DISCONTINUED | OUTPATIENT
Start: 2023-12-02 | End: 2023-12-03

## 2023-12-02 RX ORDER — DIPHENHYDRAMINE HYDROCHLORIDE 50 MG/ML
INJECTION INTRAMUSCULAR; INTRAVENOUS
Status: COMPLETED
Start: 2023-12-02 | End: 2023-12-02

## 2023-12-02 RX ORDER — ONDANSETRON HYDROCHLORIDE 2 MG/ML
4 INJECTION, SOLUTION INTRAVENOUS ONCE
Status: COMPLETED | OUTPATIENT
Start: 2023-12-02 | End: 2023-12-02

## 2023-12-02 RX ORDER — MORPHINE SULFATE 2 MG/ML
2 INJECTION, SOLUTION INTRAMUSCULAR; INTRAVENOUS ONCE
Status: COMPLETED | OUTPATIENT
Start: 2023-12-02 | End: 2023-12-02

## 2023-12-02 RX ADMIN — DIPHENHYDRAMINE HYDROCHLORIDE 25 MG: 50 INJECTION, SOLUTION INTRAMUSCULAR; INTRAVENOUS at 21:06

## 2023-12-02 RX ADMIN — IOHEXOL 75 ML: 350 INJECTION, SOLUTION INTRAVENOUS at 21:55

## 2023-12-02 RX ADMIN — ONDANSETRON 4 MG: 2 INJECTION INTRAMUSCULAR; INTRAVENOUS at 20:55

## 2023-12-02 RX ADMIN — MORPHINE SULFATE 2 MG: 2 INJECTION, SOLUTION INTRAMUSCULAR; INTRAVENOUS at 20:55

## 2023-12-02 RX ADMIN — SODIUM CHLORIDE 500 ML: 9 INJECTION, SOLUTION INTRAVENOUS at 20:55

## 2023-12-02 RX ADMIN — DIPHENHYDRAMINE HYDROCHLORIDE 25 MG: 50 INJECTION INTRAMUSCULAR; INTRAVENOUS at 21:06

## 2023-12-02 ASSESSMENT — PAIN - FUNCTIONAL ASSESSMENT
PAIN_FUNCTIONAL_ASSESSMENT: 0-10

## 2023-12-02 ASSESSMENT — PAIN DESCRIPTION - DESCRIPTORS: DESCRIPTORS: BURNING

## 2023-12-02 ASSESSMENT — PAIN SCALES - GENERAL
PAINLEVEL_OUTOF10: 2
PAINLEVEL_OUTOF10: 8

## 2023-12-02 ASSESSMENT — LIFESTYLE VARIABLES
HAVE YOU EVER FELT YOU SHOULD CUT DOWN ON YOUR DRINKING: NO
EVER FELT BAD OR GUILTY ABOUT YOUR DRINKING: NO
REASON UNABLE TO ASSESS: NO
HAVE PEOPLE ANNOYED YOU BY CRITICIZING YOUR DRINKING: NO
EVER HAD A DRINK FIRST THING IN THE MORNING TO STEADY YOUR NERVES TO GET RID OF A HANGOVER: NO

## 2023-12-02 ASSESSMENT — PAIN DESCRIPTION - PAIN TYPE: TYPE: ACUTE PAIN

## 2023-12-02 ASSESSMENT — PAIN DESCRIPTION - LOCATION: LOCATION: ABDOMEN

## 2023-12-03 PROBLEM — N30.00 ACUTE CYSTITIS: Status: ACTIVE | Noted: 2020-10-23

## 2023-12-03 PROBLEM — D64.9 NORMOCYTIC ANEMIA: Status: ACTIVE | Noted: 2023-12-03

## 2023-12-03 PROBLEM — H60.62 CHRONIC OTITIS EXTERNA OF LEFT EAR: Status: RESOLVED | Noted: 2023-12-03 | Resolved: 2023-12-03

## 2023-12-03 PROBLEM — K56.7 ILEUS (MULTI): Status: RESOLVED | Noted: 2023-01-30 | Resolved: 2023-12-03

## 2023-12-03 PROBLEM — S29.9XXA TRAUMATIC INJURY OF RIB: Status: RESOLVED | Noted: 2022-09-03 | Resolved: 2023-12-03

## 2023-12-03 PROBLEM — G89.29 CHRONIC RIGHT SHOULDER PAIN: Status: ACTIVE | Noted: 2017-11-10

## 2023-12-03 PROBLEM — K21.9 GERD (GASTROESOPHAGEAL REFLUX DISEASE): Status: ACTIVE | Noted: 2023-01-06

## 2023-12-03 PROBLEM — N30.00 ACUTE CYSTITIS: Status: ACTIVE | Noted: 2023-12-03

## 2023-12-03 PROBLEM — E78.5 HLD (HYPERLIPIDEMIA): Status: ACTIVE | Noted: 2023-01-06

## 2023-12-03 PROBLEM — M25.511 CHRONIC RIGHT SHOULDER PAIN: Status: ACTIVE | Noted: 2017-11-10

## 2023-12-03 PROBLEM — K12.0 RECURRENT APHTHOUS STOMATITIS: Status: ACTIVE | Noted: 2023-12-03

## 2023-12-03 PROBLEM — N76.2 VULVAR CELLULITIS: Status: ACTIVE | Noted: 2023-12-03

## 2023-12-03 PROBLEM — E11.9 DIABETES (MULTI): Status: RESOLVED | Noted: 2020-05-06 | Resolved: 2023-12-03

## 2023-12-03 PROBLEM — E11.9 CONTROLLED TYPE 2 DIABETES MELLITUS WITHOUT COMPLICATION, WITHOUT LONG-TERM CURRENT USE OF INSULIN (MULTI): Status: ACTIVE | Noted: 2023-01-06

## 2023-12-03 PROBLEM — N30.00 ACUTE CYSTITIS: Chronic | Status: ACTIVE | Noted: 2023-12-03

## 2023-12-03 PROBLEM — M97.9XXA PERIPROSTHETIC FRACTURE AROUND INTERNAL PROSTHETIC JOINT: Status: ACTIVE | Noted: 2023-08-26

## 2023-12-03 PROBLEM — L43.9 LICHEN PLANUS ATROPHICUS: Status: RESOLVED | Noted: 2023-12-03 | Resolved: 2023-12-03

## 2023-12-03 LAB
ALBUMIN SERPL BCP-MCNC: 4 G/DL (ref 3.4–5)
ALP SERPL-CCNC: 64 U/L (ref 33–136)
ALT SERPL W P-5'-P-CCNC: 8 U/L (ref 7–45)
ANION GAP SERPL CALC-SCNC: 10 MMOL/L (ref 10–20)
AST SERPL W P-5'-P-CCNC: 11 U/L (ref 9–39)
BILIRUB SERPL-MCNC: 0.7 MG/DL (ref 0–1.2)
BUN SERPL-MCNC: 20 MG/DL (ref 6–23)
CALCIUM SERPL-MCNC: 9 MG/DL (ref 8.6–10.3)
CHLORIDE SERPL-SCNC: 103 MMOL/L (ref 98–107)
CO2 SERPL-SCNC: 28 MMOL/L (ref 21–32)
CREAT SERPL-MCNC: 0.67 MG/DL (ref 0.5–1.05)
ERYTHROCYTE [DISTWIDTH] IN BLOOD BY AUTOMATED COUNT: 14.5 % (ref 11.5–14.5)
GFR SERPL CREATININE-BSD FRML MDRD: 83 ML/MIN/1.73M*2
GLUCOSE SERPL-MCNC: 186 MG/DL (ref 74–99)
HCT VFR BLD AUTO: 34.5 % (ref 36–46)
HGB BLD-MCNC: 10.4 G/DL (ref 12–16)
MCH RBC QN AUTO: 25.2 PG (ref 26–34)
MCHC RBC AUTO-ENTMCNC: 30.1 G/DL (ref 32–36)
MCV RBC AUTO: 84 FL (ref 80–100)
NRBC BLD-RTO: 0 /100 WBCS (ref 0–0)
PLATELET # BLD AUTO: 240 X10*3/UL (ref 150–450)
POTASSIUM SERPL-SCNC: 4.1 MMOL/L (ref 3.5–5.3)
PROT SERPL-MCNC: 6.7 G/DL (ref 6.4–8.2)
RBC # BLD AUTO: 4.13 X10*6/UL (ref 4–5.2)
SODIUM SERPL-SCNC: 137 MMOL/L (ref 136–145)
WBC # BLD AUTO: 13.3 X10*3/UL (ref 4.4–11.3)

## 2023-12-03 PROCEDURE — 96375 TX/PRO/DX INJ NEW DRUG ADDON: CPT

## 2023-12-03 PROCEDURE — 36415 COLL VENOUS BLD VENIPUNCTURE: CPT | Performed by: OBSTETRICS & GYNECOLOGY

## 2023-12-03 PROCEDURE — 2500000001 HC RX 250 WO HCPCS SELF ADMINISTERED DRUGS (ALT 637 FOR MEDICARE OP): Performed by: OBSTETRICS & GYNECOLOGY

## 2023-12-03 PROCEDURE — 2500000002 HC RX 250 W HCPCS SELF ADMINISTERED DRUGS (ALT 637 FOR MEDICARE OP, ALT 636 FOR OP/ED): Mod: MUE | Performed by: OBSTETRICS & GYNECOLOGY

## 2023-12-03 PROCEDURE — 2500000004 HC RX 250 GENERAL PHARMACY W/ HCPCS (ALT 636 FOR OP/ED): Performed by: OBSTETRICS & GYNECOLOGY

## 2023-12-03 PROCEDURE — 80053 COMPREHEN METABOLIC PANEL: CPT | Performed by: OBSTETRICS & GYNECOLOGY

## 2023-12-03 PROCEDURE — 96366 THER/PROPH/DIAG IV INF ADDON: CPT

## 2023-12-03 PROCEDURE — 96367 TX/PROPH/DG ADDL SEQ IV INF: CPT

## 2023-12-03 PROCEDURE — 2500000004 HC RX 250 GENERAL PHARMACY W/ HCPCS (ALT 636 FOR OP/ED)

## 2023-12-03 PROCEDURE — 96365 THER/PROPH/DIAG IV INF INIT: CPT

## 2023-12-03 PROCEDURE — 99222 1ST HOSP IP/OBS MODERATE 55: CPT | Performed by: OBSTETRICS & GYNECOLOGY

## 2023-12-03 PROCEDURE — 99222 1ST HOSP IP/OBS MODERATE 55: CPT | Performed by: STUDENT IN AN ORGANIZED HEALTH CARE EDUCATION/TRAINING PROGRAM

## 2023-12-03 PROCEDURE — 96372 THER/PROPH/DIAG INJ SC/IM: CPT | Performed by: OBSTETRICS & GYNECOLOGY

## 2023-12-03 PROCEDURE — 85027 COMPLETE CBC AUTOMATED: CPT | Performed by: OBSTETRICS & GYNECOLOGY

## 2023-12-03 PROCEDURE — G0378 HOSPITAL OBSERVATION PER HR: HCPCS

## 2023-12-03 RX ORDER — OXYCODONE HYDROCHLORIDE 5 MG/1
5 TABLET ORAL EVERY 4 HOURS PRN
Status: DISCONTINUED | OUTPATIENT
Start: 2023-12-03 | End: 2023-12-04 | Stop reason: HOSPADM

## 2023-12-03 RX ORDER — HEPARIN SODIUM 5000 [USP'U]/ML
5000 INJECTION, SOLUTION INTRAVENOUS; SUBCUTANEOUS EVERY 8 HOURS SCHEDULED
Status: DISCONTINUED | OUTPATIENT
Start: 2023-12-03 | End: 2023-12-04 | Stop reason: HOSPADM

## 2023-12-03 RX ORDER — ASPIRIN 81 MG/1
81 TABLET ORAL DAILY
COMMUNITY

## 2023-12-03 RX ORDER — VANCOMYCIN HYDROCHLORIDE 1 G/200ML
1000 INJECTION, SOLUTION INTRAVENOUS ONCE
Status: COMPLETED | OUTPATIENT
Start: 2023-12-03 | End: 2023-12-03

## 2023-12-03 RX ORDER — SIMVASTATIN 20 MG/1
20 TABLET, FILM COATED ORAL NIGHTLY
COMMUNITY

## 2023-12-03 RX ORDER — PAROXETINE 10 MG/1
10 TABLET, FILM COATED ORAL EVERY MORNING
COMMUNITY

## 2023-12-03 RX ORDER — IBUPROFEN 600 MG/1
600 TABLET ORAL EVERY 6 HOURS PRN
Status: DISCONTINUED | OUTPATIENT
Start: 2023-12-03 | End: 2023-12-04 | Stop reason: HOSPADM

## 2023-12-03 RX ORDER — ASPIRIN 81 MG/1
81 TABLET ORAL DAILY
Status: DISCONTINUED | OUTPATIENT
Start: 2023-12-03 | End: 2023-12-04

## 2023-12-03 RX ORDER — POLYETHYLENE GLYCOL 3350 17 G/17G
17 POWDER, FOR SOLUTION ORAL DAILY
Status: DISCONTINUED | OUTPATIENT
Start: 2023-12-03 | End: 2023-12-04 | Stop reason: HOSPADM

## 2023-12-03 RX ORDER — OMEPRAZOLE 40 MG/1
40 CAPSULE, DELAYED RELEASE ORAL
COMMUNITY

## 2023-12-03 RX ORDER — DOCUSATE SODIUM 100 MG/1
100 CAPSULE, LIQUID FILLED ORAL 2 TIMES DAILY
Status: DISCONTINUED | OUTPATIENT
Start: 2023-12-03 | End: 2023-12-04

## 2023-12-03 RX ORDER — FERROUS SULFATE, DRIED 160(50) MG
1 TABLET, EXTENDED RELEASE ORAL DAILY
Status: DISCONTINUED | OUTPATIENT
Start: 2023-12-03 | End: 2023-12-04 | Stop reason: HOSPADM

## 2023-12-03 RX ORDER — POLYETHYLENE GLYCOL 3350 17 G/17G
17 POWDER, FOR SOLUTION ORAL DAILY
COMMUNITY

## 2023-12-03 RX ORDER — PAROXETINE 10 MG/1
10 TABLET, FILM COATED ORAL EVERY MORNING
Status: DISCONTINUED | OUTPATIENT
Start: 2023-12-03 | End: 2023-12-04 | Stop reason: HOSPADM

## 2023-12-03 RX ORDER — ONDANSETRON 4 MG/1
4 TABLET, ORALLY DISINTEGRATING ORAL EVERY 6 HOURS PRN
Status: DISCONTINUED | OUTPATIENT
Start: 2023-12-03 | End: 2023-12-04 | Stop reason: HOSPADM

## 2023-12-03 RX ORDER — METFORMIN HYDROCHLORIDE 850 MG/1
850 TABLET ORAL
COMMUNITY

## 2023-12-03 RX ORDER — LISINOPRIL 10 MG/1
10 TABLET ORAL DAILY
COMMUNITY

## 2023-12-03 RX ORDER — VANCOMYCIN HYDROCHLORIDE 1 G/200ML
1 INJECTION, SOLUTION INTRAVENOUS EVERY 24 HOURS
Status: DISCONTINUED | OUTPATIENT
Start: 2023-12-04 | End: 2023-12-04

## 2023-12-03 RX ORDER — MELOXICAM 15 MG/1
15 TABLET ORAL DAILY
Status: DISCONTINUED | OUTPATIENT
Start: 2023-12-03 | End: 2023-12-04 | Stop reason: HOSPADM

## 2023-12-03 RX ORDER — SIMVASTATIN 20 MG/1
20 TABLET, FILM COATED ORAL NIGHTLY
Status: DISCONTINUED | OUTPATIENT
Start: 2023-12-03 | End: 2023-12-04 | Stop reason: HOSPADM

## 2023-12-03 RX ORDER — MELOXICAM 15 MG/1
15 TABLET ORAL DAILY
COMMUNITY

## 2023-12-03 RX ORDER — ACETAMINOPHEN 325 MG/1
650 TABLET ORAL EVERY 4 HOURS PRN
Status: DISCONTINUED | OUTPATIENT
Start: 2023-12-03 | End: 2023-12-04 | Stop reason: HOSPADM

## 2023-12-03 RX ORDER — PANTOPRAZOLE SODIUM 40 MG/1
40 TABLET, DELAYED RELEASE ORAL
Status: DISCONTINUED | OUTPATIENT
Start: 2023-12-04 | End: 2023-12-04

## 2023-12-03 RX ORDER — OXYCODONE HYDROCHLORIDE 10 MG/1
10 TABLET ORAL EVERY 4 HOURS PRN
Status: DISCONTINUED | OUTPATIENT
Start: 2023-12-03 | End: 2023-12-04 | Stop reason: HOSPADM

## 2023-12-03 RX ORDER — DOCUSATE SODIUM 100 MG/1
100 CAPSULE, LIQUID FILLED ORAL 2 TIMES DAILY
COMMUNITY

## 2023-12-03 RX ORDER — MULTIVITAMIN
1 TABLET ORAL DAILY
COMMUNITY

## 2023-12-03 RX ORDER — METFORMIN HYDROCHLORIDE 850 MG/1
850 TABLET ORAL
Status: DISCONTINUED | OUTPATIENT
Start: 2023-12-03 | End: 2023-12-04 | Stop reason: HOSPADM

## 2023-12-03 RX ORDER — LISINOPRIL 10 MG/1
10 TABLET ORAL DAILY
Status: DISCONTINUED | OUTPATIENT
Start: 2023-12-03 | End: 2023-12-04 | Stop reason: HOSPADM

## 2023-12-03 RX ADMIN — PIPERACILLIN SODIUM AND TAZOBACTAM SODIUM 3.38 G: 3; .375 INJECTION, SOLUTION INTRAVENOUS at 00:25

## 2023-12-03 RX ADMIN — HEPARIN SODIUM 5000 UNITS: 5000 INJECTION INTRAVENOUS; SUBCUTANEOUS at 18:37

## 2023-12-03 RX ADMIN — PIPERACILLIN SODIUM AND TAZOBACTAM SODIUM 3.38 G: 3; .375 INJECTION, SOLUTION INTRAVENOUS at 08:30

## 2023-12-03 RX ADMIN — VANCOMYCIN HYDROCHLORIDE 1000 MG: 1 INJECTION, SOLUTION INTRAVENOUS at 01:23

## 2023-12-03 RX ADMIN — PIPERACILLIN SODIUM AND TAZOBACTAM SODIUM 3.38 G: 3; .375 INJECTION, SOLUTION INTRAVENOUS at 16:40

## 2023-12-03 RX ADMIN — HEPARIN SODIUM 5000 UNITS: 5000 INJECTION INTRAVENOUS; SUBCUTANEOUS at 01:53

## 2023-12-03 RX ADMIN — DOCUSATE SODIUM 100 MG: 100 CAPSULE, LIQUID FILLED ORAL at 20:20

## 2023-12-03 RX ADMIN — METFORMIN HYDROCHLORIDE 850 MG: 850 TABLET ORAL at 18:44

## 2023-12-03 RX ADMIN — ASPIRIN 81 MG: 81 TABLET, COATED ORAL at 13:22

## 2023-12-03 RX ADMIN — LISINOPRIL 10 MG: 10 TABLET ORAL at 13:21

## 2023-12-03 RX ADMIN — SIMVASTATIN 20 MG: 20 TABLET, FILM COATED ORAL at 20:20

## 2023-12-03 RX ADMIN — Medication 1 TABLET: at 13:21

## 2023-12-03 RX ADMIN — PAROXETINE 10 MG: 10 TABLET, FILM COATED ORAL at 13:21

## 2023-12-03 RX ADMIN — MELOXICAM 15 MG: 15 TABLET ORAL at 13:21

## 2023-12-03 RX ADMIN — HEPARIN SODIUM 5000 UNITS: 5000 INJECTION INTRAVENOUS; SUBCUTANEOUS at 10:59

## 2023-12-03 SDOH — SOCIAL STABILITY: SOCIAL INSECURITY: ARE YOU OR HAVE YOU BEEN THREATENED OR ABUSED PHYSICALLY, EMOTIONALLY, OR SEXUALLY BY ANYONE?: NO

## 2023-12-03 SDOH — SOCIAL STABILITY: SOCIAL INSECURITY: DO YOU FEEL UNSAFE GOING BACK TO THE PLACE WHERE YOU ARE LIVING?: NO

## 2023-12-03 SDOH — SOCIAL STABILITY: SOCIAL INSECURITY: ARE THERE ANY APPARENT SIGNS OF INJURIES/BEHAVIORS THAT COULD BE RELATED TO ABUSE/NEGLECT?: NO

## 2023-12-03 SDOH — SOCIAL STABILITY: SOCIAL INSECURITY: DOES ANYONE TRY TO KEEP YOU FROM HAVING/CONTACTING OTHER FRIENDS OR DOING THINGS OUTSIDE YOUR HOME?: NO

## 2023-12-03 SDOH — SOCIAL STABILITY: SOCIAL INSECURITY: DO YOU FEEL ANYONE HAS EXPLOITED OR TAKEN ADVANTAGE OF YOU FINANCIALLY OR OF YOUR PERSONAL PROPERTY?: NO

## 2023-12-03 SDOH — SOCIAL STABILITY: SOCIAL INSECURITY

## 2023-12-03 SDOH — SOCIAL STABILITY: SOCIAL INSECURITY: ABUSE: ADULT

## 2023-12-03 SDOH — SOCIAL STABILITY: SOCIAL INSECURITY: WERE YOU ABLE TO COMPLETE ALL THE BEHAVIORAL HEALTH SCREENINGS?: YES

## 2023-12-03 SDOH — SOCIAL STABILITY: SOCIAL INSECURITY: HAS ANYONE EVER THREATENED TO HURT YOUR FAMILY OR YOUR PETS?: NO

## 2023-12-03 SDOH — SOCIAL STABILITY: SOCIAL INSECURITY: HAVE YOU HAD THOUGHTS OF HARMING ANYONE ELSE?: NO

## 2023-12-03 ASSESSMENT — LIFESTYLE VARIABLES
PRESCIPTION_ABUSE_PAST_12_MONTHS: NO
AUDIT-C TOTAL SCORE: 0
AUDIT-C TOTAL SCORE: 0
HOW OFTEN DO YOU HAVE 6 OR MORE DRINKS ON ONE OCCASION: NEVER
HOW MANY STANDARD DRINKS CONTAINING ALCOHOL DO YOU HAVE ON A TYPICAL DAY: PATIENT DOES NOT DRINK
HOW OFTEN DO YOU HAVE A DRINK CONTAINING ALCOHOL: NEVER
SKIP TO QUESTIONS 9-10: 1
SUBSTANCE_ABUSE_PAST_12_MONTHS: NO

## 2023-12-03 ASSESSMENT — COGNITIVE AND FUNCTIONAL STATUS - GENERAL
DRESSING REGULAR LOWER BODY CLOTHING: A LITTLE
TOILETING: A LITTLE
DAILY ACTIVITIY SCORE: 19
MOBILITY SCORE: 19
PERSONAL GROOMING: A LITTLE
DAILY ACTIVITIY SCORE: 19
CLIMB 3 TO 5 STEPS WITH RAILING: A LOT
CLIMB 3 TO 5 STEPS WITH RAILING: A LOT
PERSONAL GROOMING: A LITTLE
WALKING IN HOSPITAL ROOM: A LITTLE
MOVING TO AND FROM BED TO CHAIR: A LITTLE
DRESSING REGULAR LOWER BODY CLOTHING: A LITTLE
DRESSING REGULAR LOWER BODY CLOTHING: A LITTLE
MOVING TO AND FROM BED TO CHAIR: A LITTLE
PERSONAL GROOMING: A LITTLE
DRESSING REGULAR UPPER BODY CLOTHING: A LITTLE
DRESSING REGULAR UPPER BODY CLOTHING: A LITTLE
MOBILITY SCORE: 19
MOVING TO AND FROM BED TO CHAIR: A LITTLE
TOILETING: A LITTLE
HELP NEEDED FOR BATHING: A LITTLE
PATIENT BASELINE BEDBOUND: NO
WALKING IN HOSPITAL ROOM: A LITTLE
DAILY ACTIVITIY SCORE: 19
WALKING IN HOSPITAL ROOM: A LITTLE
TOILETING: A LITTLE
STANDING UP FROM CHAIR USING ARMS: A LITTLE
STANDING UP FROM CHAIR USING ARMS: A LITTLE
HELP NEEDED FOR BATHING: A LITTLE
DRESSING REGULAR UPPER BODY CLOTHING: A LITTLE
MOBILITY SCORE: 19
HELP NEEDED FOR BATHING: A LITTLE
CLIMB 3 TO 5 STEPS WITH RAILING: A LOT
STANDING UP FROM CHAIR USING ARMS: A LITTLE

## 2023-12-03 ASSESSMENT — ENCOUNTER SYMPTOMS
ABDOMINAL PAIN: 0
FACIAL SWELLING: 0
COUGH: 0
SHORTNESS OF BREATH: 0
DIARRHEA: 0
WEAKNESS: 0
NAUSEA: 0
NERVOUS/ANXIOUS: 0
PALPITATIONS: 0
HEADACHES: 0
WHEEZING: 0
ABDOMINAL DISTENTION: 0
LIGHT-HEADEDNESS: 0
DYSURIA: 1
DYSPHORIC MOOD: 0
FEVER: 0
VOMITING: 0
CHILLS: 0
DIZZINESS: 0
APPETITE CHANGE: 0

## 2023-12-03 ASSESSMENT — ACTIVITIES OF DAILY LIVING (ADL)
ADEQUATE_TO_COMPLETE_ADL: NO
FEEDING YOURSELF: NEEDS ASSISTANCE
HEARING - RIGHT EAR: HEARING AID
TOILETING: NEEDS ASSISTANCE
DRESSING YOURSELF: NEEDS ASSISTANCE
ASSISTIVE_DEVICE: WALKER;WHEELCHAIR
LACK_OF_TRANSPORTATION: NO
GROOMING: NEEDS ASSISTANCE
WALKS IN HOME: NEEDS ASSISTANCE
BATHING: NEEDS ASSISTANCE
PATIENT'S MEMORY ADEQUATE TO SAFELY COMPLETE DAILY ACTIVITIES?: NO
HEARING - LEFT EAR: FUNCTIONAL
JUDGMENT_ADEQUATE_SAFELY_COMPLETE_DAILY_ACTIVITIES: NO

## 2023-12-03 ASSESSMENT — PAIN SCALES - GENERAL
PAINLEVEL_OUTOF10: 0 - NO PAIN
PAINLEVEL_OUTOF10: 2
PAINLEVEL_OUTOF10: 0 - NO PAIN

## 2023-12-03 ASSESSMENT — PATIENT HEALTH QUESTIONNAIRE - PHQ9
1. LITTLE INTEREST OR PLEASURE IN DOING THINGS: NOT AT ALL
SUM OF ALL RESPONSES TO PHQ9 QUESTIONS 1 & 2: 0
2. FEELING DOWN, DEPRESSED OR HOPELESS: NOT AT ALL

## 2023-12-03 ASSESSMENT — PAIN - FUNCTIONAL ASSESSMENT
PAIN_FUNCTIONAL_ASSESSMENT: 0-10
PAIN_FUNCTIONAL_ASSESSMENT: 0-10

## 2023-12-03 NOTE — ED PROVIDER NOTES
HPI: The patient is a 89-year-old female history of HTN, HLD, GERD presenting with abdominal pain.  This been ongoing for the past day or so.  Patient with limited English ability is unable to clearly characterize this, though she has been able to point to her left lower quadrant and states it is burning.  She has a known ventral wall hernia which nursing was concerned appendectomy, more enlarged recently.  She has chronic constipation issues but denies any nausea, vomiting, fever, chills, chest pain, shortness of breath.  She is continue to eat and drink normally.  Notably, she notes a burning sensation around her perineum and dysuria.  Her daughter noticed an unusual dangling portion of tissue around the perineum earlier when they were changing her.  She denies having seen this before.  She is otherwise asymptomatic.    Review of Systems:  CONSTITUTIONAL: Denies fever, sweats, chills.  NEURO: Denies difficulty walking, numbness, weakness, tingling, headache.  HEENT: Denies sore throat, rhinorrhea, epistaxis, changes in vision.  CARDIO: Denies chest pain, palpitations.  PULM: Denies shortness of breath, cough.  GI: Reports abdominal pain.denies nausea, vomiting, diarrhea, constipation, melena, hematochezia.  : Reports painful urination.denies frequency, hematuria.  MSK: Denies recent trauma.  SKIN: Denies rash, lesions.  ENDOCRINE: Denies unexpected weight-loss.  HEME: Denies bleeding disorder.  ------------------------------------------------------------  Past medical history: HTN, GERD, HLD  Past surgical history: Reviewed  Social history: Former smoker.  Denies tobacco use, alcohol use, recreational drug usage  Family history: Reviewed and noncontributory to today's presentation unless otherwise noted.  ALLERGIES: As documented in EMR were reviewed and discussed with patient.  ------------------------------------------------------------  Physical exam:  VS: As documented in the triage note from today's date  and EMR flowsheet were reviewed.  Gen: Frail-appearing.  No acute distress. Seated in bed.  Skin: Warm. Dry. Intact. No rashes or lesions.  Eyes: Pupils equally round and reactive to light. Clear sclera. EOMI.  HENT: Atraumatic appearance. Mucosal membranes moist.  CV: Regular rate and rhythm. S1, S2. No pedal edema. Warm extremities.  Resp: Nonlabored breathing Clear to auscultation bilaterally.  GI: Soft, nondistended, tender to palpation over a large left lower quadrant ventral wall hernia.  No overlying skin changes.  : Induration without fluctuance from the right labia minora involving the clitoral gaytan  Rectal: No prolapse, bleeding.  MSK: Symmetric muscle bulk. No joint swelling in the extremities.  Neuro: Alert. Speech fluent. Moving all extremities. No focal deficits  Psych: Appropriate. Kempt.  ------------------------------------------------------------  Hospital Course / Medical Decision Making:  History obtained with the patient and her daughter.  Records including labs, imaging, notes reviewed as pertinent.  Concern for possible strangulated hernia, urinary tract infection, cellulitis of the external female genitalia.  Abdominal pain workup was initiated.  Patient was given morphine, Zofran, intravenous fluids with improvement in her pain.  CMP hemolyzed and otherwise unremarkable except for mildly elevated BUN of 32.  CBC notable for leukocytosis of 13.3.  She has a stable chronic hemoglobin of 10.3.  Lipase and lactate within normal limits.  Urinalysis with positive nitrites, trace leukocyte Estrace, bacteria consistent with UTI.  CT of the abdomen pelvis did not cover the female genitalia, noted stable hernias, but no other acute findings.  Gynecology was consulted and performed bedside exam.  She requested observation to the gynecology service for intravenous antibiotics and observation.  Patient was given Zosyn and vancomycin to cover both the soft tissue infection and the UTI.  Patient's  daughter was amenable to this plan.    I reviewed the case with the attending ED physician. The attending ED physician agrees with the plan. Patient was counseled regarding labs, imaging, likely diagnosis, and plan. All questions were answered.     James Alberto MD  Resident  12/03/23 0010

## 2023-12-03 NOTE — CARE PLAN
The patient's goals for the shift include  less burning to jenna area.     The clinical goals for the shift include remain hemodynamically stable .

## 2023-12-03 NOTE — H&P
History Of Present Illness  Natacha Ragsdale is a 89 y.o. female presenting with vulvar pain and burning. Patient speaks only [place] and daughter assists with translation and provides some history.    Patient lives in a care home, so daughter is unsure when this vulvar pain started. Patient's son reported she was rubbing/scratching the area today. Patient reports it as a burning pain. No fevers or chills. No other symptoms associated.     Past Medical History  Past Medical History:   Diagnosis Date    Personal history of other diseases of the circulatory system     History of hypertension    Personal history of other diseases of the digestive system     History of gastroesophageal reflux (GERD)    Personal history of other endocrine, nutritional and metabolic disease     History of high cholesterol       Surgical History  Past Surgical History:   Procedure Laterality Date    APPENDECTOMY  04/10/2018    Appendectomy    GALLBLADDER SURGERY  04/10/2018    Gallbladder Surgery    TOTAL KNEE ARTHROPLASTY  04/10/2018    Knee Replacement        Social History  She has no history on file for tobacco use, alcohol use, and drug use.    Family History  No family history on file.     Allergies  Patient has no known allergies.    Review of Systems   Constitutional:  Negative for appetite change, chills and fever.   Respiratory:  Negative for cough and shortness of breath.    Cardiovascular:  Negative for chest pain.   Gastrointestinal:  Negative for abdominal pain.   Genitourinary:  Positive for dysuria, genital sores and pelvic pain.   Neurological:  Negative for dizziness, weakness, light-headedness and headaches.   Psychiatric/Behavioral:  The patient is not nervous/anxious.         Physical Exam  Constitutional:       General: She is not in acute distress.     Appearance: Normal appearance. She is not ill-appearing.   Cardiovascular:      Rate and Rhythm: Normal rate and regular rhythm.      Pulses: Normal pulses.   Pulmonary:       Effort: Pulmonary effort is normal.   Abdominal:      General: Abdomen is flat.      Palpations: Abdomen is soft.      Tenderness: There is no abdominal tenderness. There is no guarding.   Genitourinary:     Labia:         Right: Tenderness and lesion present.         Left: Tenderness and lesion present.       Comments: Induration and redness present over clitoral gaytan and upper 1-2 cm of bilateral labia minora. No discrete abscess.  Musculoskeletal:         General: Normal range of motion.      Right lower leg: No edema.      Left lower leg: No edema.   Skin:     General: Skin is warm and dry.   Neurological:      General: No focal deficit present.      Mental Status: She is alert and oriented to person, place, and time.   Psychiatric:         Mood and Affect: Mood normal.         Behavior: Behavior normal.          Last Recorded Vitals  Blood pressure 119/55, pulse 74, temperature 37.3 °C (99.1 °F), temperature source Tympanic, resp. rate 17, height 1.524 m (5'), weight 64.4 kg (142 lb), SpO2 96 %.    Relevant Results      Results for orders placed or performed during the hospital encounter of 12/02/23 (from the past 24 hour(s))   CBC and Auto Differential   Result Value Ref Range    WBC 13.3 (H) 4.4 - 11.3 x10*3/uL    nRBC 0.0 0.0 - 0.0 /100 WBCs    RBC 4.08 4.00 - 5.20 x10*6/uL    Hemoglobin 10.3 (L) 12.0 - 16.0 g/dL    Hematocrit 34.0 (L) 36.0 - 46.0 %    MCV 83 80 - 100 fL    MCH 25.2 (L) 26.0 - 34.0 pg    MCHC 30.3 (L) 32.0 - 36.0 g/dL    RDW 14.5 11.5 - 14.5 %    Platelets 229 150 - 450 x10*3/uL    Neutrophils % 80.0 40.0 - 80.0 %    Immature Granulocytes %, Automated 0.4 0.0 - 0.9 %    Lymphocytes % 11.2 13.0 - 44.0 %    Monocytes % 7.7 2.0 - 10.0 %    Eosinophils % 0.5 0.0 - 6.0 %    Basophils % 0.2 0.0 - 2.0 %    Neutrophils Absolute 10.62 (H) 1.60 - 5.50 x10*3/uL    Immature Granulocytes Absolute, Automated 0.05 0.00 - 0.50 x10*3/uL    Lymphocytes Absolute 1.48 0.80 - 3.00 x10*3/uL    Monocytes  Absolute 1.02 (H) 0.05 - 0.80 x10*3/uL    Eosinophils Absolute 0.06 0.00 - 0.40 x10*3/uL    Basophils Absolute 0.02 0.00 - 0.10 x10*3/uL   Phosphorus   Result Value Ref Range    Phosphorus 4.4 2.5 - 4.9 mg/dL   Magnesium   Result Value Ref Range    Magnesium 2.23 1.60 - 2.40 mg/dL   Comprehensive metabolic panel   Result Value Ref Range    Glucose 146 (H) 74 - 99 mg/dL    Sodium 134 (L) 136 - 145 mmol/L    Potassium 5.4 (H) 3.5 - 5.3 mmol/L    Chloride 99 98 - 107 mmol/L    Bicarbonate 27 21 - 32 mmol/L    Anion Gap 13 10 - 20 mmol/L    Urea Nitrogen 32 (H) 6 - 23 mg/dL    Creatinine 0.80 0.50 - 1.05 mg/dL    eGFR 70 >60 mL/min/1.73m*2    Calcium 9.2 8.6 - 10.3 mg/dL    Albumin 4.1 3.4 - 5.0 g/dL    Alkaline Phosphatase 65 33 - 136 U/L    Total Protein 7.3 6.4 - 8.2 g/dL    AST 27 9 - 39 U/L    Bilirubin, Total 0.4 0.0 - 1.2 mg/dL    ALT 11 7 - 45 U/L   Lipase   Result Value Ref Range    Lipase 14 9 - 82 U/L   Lactate   Result Value Ref Range    Lactate 1.9 0.4 - 2.0 mmol/L   Urinalysis with Reflex Microscopic and Culture   Result Value Ref Range    Color, Urine Yellow Straw, Yellow    Appearance, Urine Clear Clear    Specific Gravity, Urine 1.008 1.005 - 1.035    pH, Urine 5.0 5.0, 5.5, 6.0, 6.5, 7.0, 7.5, 8.0    Protein, Urine NEGATIVE NEGATIVE mg/dL    Glucose, Urine NEGATIVE NEGATIVE mg/dL    Blood, Urine SMALL (1+) (A) NEGATIVE    Ketones, Urine NEGATIVE NEGATIVE mg/dL    Bilirubin, Urine NEGATIVE NEGATIVE    Urobilinogen, Urine <2.0 <2.0 mg/dL    Nitrite, Urine POSITIVE (A) NEGATIVE    Leukocyte Esterase, Urine TRACE (A) NEGATIVE   Extra Urine Gray Tube   Result Value Ref Range    Extra Tube Hold for add-ons.    Microscopic Only, Urine   Result Value Ref Range    WBC, Urine 1-5 1-5, NONE /HPF    RBC, Urine NONE NONE, 1-2, 3-5 /HPF    Bacteria, Urine 1+ (A) NONE SEEN /HPF          Assessment/Plan   Principal Problem:    Vulvar cellulitis  Active Problems:    Acute cystitis      Vulvar cellulitis  - redness and  induration over clitoral gaytan and upper labia minora without discrete abscess   - WBC 13.3, lactate 1.9  - afebrile, VSS  - discussed with the daughter that there is no discrete area to drain  - given her age, the appearance of the tissue and her elevated WBC, recommend admission with IV abx  - will start vancomycin/Zosyn  - pain control with Tylenol/Motrin prn, oxycodone available for severe pain    UTI  - UA with +nitrites, Ucx pending  - could also be cause for elevated WBC  - Zosyn will cover for now, will plan to transition to PO abx on discharge       Delphine Jefferson MD

## 2023-12-03 NOTE — PROGRESS NOTES
"Vancomycin Dosing by Pharmacy- INITIAL    Natacha Ragsdale is a 89 y.o. year old female who Pharmacy has been consulted for vancomycin dosing for cellulitis, skin and soft tissue. Based on the patient's indication and renal status this patient will be dosed based on a goal AUC of 400-600.     Renal function is currently stable.    Visit Vitals  /71 (BP Location: Right arm, Patient Position: Lying)   Pulse 88   Temp 36.5 °C (97.7 °F)   Resp 18        Lab Results   Component Value Date    CREATININE 0.80 12/02/2023    CREATININE 0.81 10/18/2023    CREATININE 0.73 10/06/2023    CREATININE 0.86 09/17/2023        Patient weight is No results found for: \"PTWEIGHT\"    No results found for: \"CULTURE\"     No intake/output data recorded.  [unfilled]    No results found for: \"PATIENTTEMP\"       Assessment/Plan     Patient has already been given a loading dose of 1000 mg.  Will initiate vancomycin maintenance,  1000 mg every 24 hours.    This dosing regimen is predicted by [id number] to result in the following pharmacokinetic parameters:  <<<<<PASTE [employer] DATA Loading dose: N/A  Regimen: 1000 mg IV every 24 hours.  Start time: 13:23 on 12/03/2023  Exposure target: AUC24 (range)400-600 mg/L.hr   AUC24,ss: 431 mg/L.hr  Probability of AUC24 > 400: 59 %  Ctrough,ss: 12.9 mg/L  Probability of Ctrough,ss > 20: 12 %  Probability of nephrotoxicity (Lodise CANDIS 2009): 8 %      Follow-up level will be ordered on 12-5-23 at 1200 unless clinically indicated sooner.  Will continue to monitor renal function daily while on vancomycin and order serum creatinine at least every 48 hours if not already ordered.  Follow for continued vancomycin needs, clinical response, and signs/symptoms of toxicity.       Maryjo L Lopresti, PharmD       "

## 2023-12-03 NOTE — CONSULTS
Inpatient consult to Medicine  Consult performed by: Larry Lopez DO  Consult ordered by: Joselyn Vasquez MD  Reason for consult: Co-management of peripheral medical conditions; pt admitted with vulvar cellulitis and UTI.  Assessment/Recommendations: Remains on vanc/zosyn for coverage of both cellulitis and UTI; UA with nitrites/LE and culture pending. Could consider narrowing coverage ahead of plan for switch to PO tomorrow, but will defer to primary.    Vitals remain stable, pt grossly lucid/alert though seems to drift back and forth between [place] and English but does respond appropriately to questions.    Serum glucose acutely stable, mildly elevated and may continue to monitor on AM labs. If worsens or develops hypoglycemia, would start checking qAC/HS and initiate SSI.    Agree with home meds as presently ordered, has appropriate DVT PPX.    Consult is appreciated; hospital medicine will continue to follow.      Larry Lopez DO  /[facility]      History Of Present Illness  Natacha Ragsdale is a 89 y.o. female presenting with vulvar pain/burning via SNF, children noticed pt was manipulating the area and investigated further, but otherwise were unaware of the issue. Reviewed ED documentation, H&P, and met patient at bedside.     Past Medical History  She has a past medical history of Chronic otitis externa of left ear (12/03/2023), Diabetes (CMS/[facility]) (05/06/2020), Ileus (CMS/[facility]) (01/30/2023), Lumbago (07/10/2009), Pain in joint, lower leg (08/01/2007), Personal history of other diseases of the circulatory system, Personal history of other diseases of the digestive system, Personal history of other endocrine, nutritional and metabolic disease, and Traumatic injury of rib (09/03/2022).    Surgical History  She has a past surgical history that includes Appendectomy (04/10/2018); Gallbladder surgery (04/10/2018); and Total knee arthroplasty (04/10/2018).     Social History  She reports that she has  never smoked. She has never used smokeless tobacco. No history on file for alcohol use and drug use.    Family History  No family history on file.     Allergies  Patient has no known allergies.    Review of Systems   Constitutional:  Negative for chills and fever.   HENT:  Positive for hearing loss. Negative for facial swelling.    Eyes:  Negative for visual disturbance.   Respiratory:  Negative for shortness of breath and wheezing.    Cardiovascular:  Negative for chest pain and palpitations.   Gastrointestinal:  Negative for abdominal distention, diarrhea, nausea and vomiting.   Genitourinary:  Positive for dysuria, genital sores and vaginal pain.   Skin:  Positive for rash.   Psychiatric/Behavioral:  Negative for dysphoric mood and suicidal ideas.    All other systems reviewed and are negative.          Physical Exam  Vitals and nursing note reviewed.   Constitutional:       General: She is not in acute distress.     Appearance: Normal appearance. She is not ill-appearing.   HENT:      Head: Normocephalic and atraumatic.      Right Ear: External ear normal.      Left Ear: External ear normal.      Nose: Nose normal.   Eyes:      Extraocular Movements: Extraocular movements intact.   Cardiovascular:      Rate and Rhythm: Normal rate and regular rhythm.   Pulmonary:      Effort: Pulmonary effort is normal. No respiratory distress.      Breath sounds: Normal breath sounds.   Abdominal:      General: There is no distension.   Musculoskeletal:         General: No signs of injury.      Cervical back: Normal range of motion.   Skin:     General: Skin is warm and dry.   Neurological:      General: No focal deficit present.      Mental Status: She is alert. Mental status is at baseline.   Psychiatric:         Mood and Affect: Mood normal.         Behavior: Behavior normal.             Last Recorded Vitals  /52 (BP Location: Right arm, Patient Position: Lying)   Pulse 78   Temp 36.3 °C (97.3 °F)   Resp 18   Wt  67.9 kg (149 lb 11.1 oz)   SpO2 97%     Relevant Results  CT abdomen pelvis w IV contrast    Result Date: 12/2/2023  STUDY: CT Abdomen and Pelvis with IV Contrast; 12/2/2023 9:56 PM. INDICATION: Left lower quadrant abdominal pain.  Large hernia. COMPARISON: CT AP 10/18/2023. ACCESSION NUMBER(S): [id number] ORDERING CLINICIAN: James Alberto TECHNIQUE: CT of the abdomen and pelvis was performed.  Contiguous axial images were obtained at 3 mm slice thickness through the abdomen and pelvis. Coronal and sagittal reconstructions at 3 mm slice thickness were performed.  Omnipaque 350 75 mL was administered intravenously.  FINDINGS: Abdomen: The gallbladder is surgically absent with moderate dilatation of the common bile duct and intrahepatic bile ducts, minimally increased since prior exam. A small calculus in the distal CBD is not excluded (series 201, slice 59/151). No significant abnormalities are detectable in the liver or spleen. Pancreas demonstrates age-related atrophy. Adrenal glands are normal. Minimal scarring and a few small cysts are seen in the kidneys. There are no renal calculi and there is no hydronephrosis. There is no bulky lymphadenopathy or ascites. Abdominal aorta is mildly calcified without aneurysm. IVC is patent. The stomach is non-distended with a small hiatal hernia. Small bowel is normal caliber. Appendix is not seen. Moderate stool burden distends the colon. There is diverticulosis without diverticulitis. There is no colitis, abscess, or free air. Pelvis: Adjacent hernias are seen in the left lower pelvis and inguinal region, with spigelian-type hernia containing fat, and adjacent moderate inguinal hernia containing small bowel. Small bowel loops in the inguinal hernia sac are minimally distended but not overtly obstructed (series 201, slice 132/151). Bladder is moderately distended. No deep pelvic mass or fluid collection is seen. Pelvic veins are patent. Skeletal: Mild arthritis is seen in  the spine. No compression fractures are seen. There has been fracture surgery at the left proximal femur. Lower chest: Heart is minimally enlarged. There is minimal dependent atelectasis. There are no pleural effusions.    1. Adjacent hernias in the left lower pelvis and inguinal region, including moderate left inguinal hernia containing minimally distended but not overly obstructed small bowel. 2. Moderate stool distention colon. 3. Increasing moderate dilatation common bile duct and intrahepatic bile ducts following cholecystectomy; small distal CBD calculus not excluded. 4. Remainder as above. Signed by Sandeep Hernandez MD    XR femur left 2+ views    Result Date: 11/14/2023  EXAMINATION: XR FEMUR LEFT MINIMUM 2 VIEWSPRO/LT   11/14/2023 09:16 AM CLINICAL HISTORY: follow up ASSOCIATED DIAGNOSIS: Periprosthetic supracondylar fracture of femur, initial encounter Periprosthetic supracondylar fracture of femur, initial encounter ORDERING PROVIDER: [employer] NOTE: COMPARISON: XR FEMUR LEFT MINIMUM 2 VIEWS 8/27/2023, 12:14 AM FINDINGS: There is been interval placement of compression plate along the entire lateral aspect of femoral diaphysis to level of the lateral femoral condyle, an additional plate along the medial aspect distal femur extending to medial condyle transfixing the previously demonstrated comminuted distal periprosthetic fracture. The major distal femur fragment has mild posterior displacement, and other fragments are generally anatomically aligned. The hardware is intact. IMPRESSION: ORIF distal femoral comminuted periprosthetic fracture with intact appearing hardware. Left femur MACRO: None    XR knee left 1-2 views    Result Date: 11/14/2023  EXAMINATION: XR KNEE LEFT AP+LAT 2 VIEWSPRO/LT   11/14/2023 09:16 AM CLINICAL HISTORY: follow up ASSOCIATED DIAGNOSIS: Periprosthetic supracondylar fracture of femur, initial encounter Periprosthetic supracondylar fracture of femur, initial  encounter  ORDERING PROVIDER: [employer] NOTE: COMPARISON: XR KNEE LT ANY 4 OR MORE VIEWS 8/27/2023, 12:14 AM IMPRESSION: Left knee arthroplasty appears intact. Interval placement of medial and lateral hardware transfixing distal femur fracture (see separate femur report). Left knee MACRO: None   Results for orders placed or performed during the hospital encounter of 12/02/23 (from the past 24 hour(s))   CBC   Result Value Ref Range    WBC 13.3 (H) 4.4 - 11.3 x10*3/uL    nRBC 0.0 0.0 - 0.0 /100 WBCs    RBC 4.13 4.00 - 5.20 x10*6/uL    Hemoglobin 10.4 (L) 12.0 - 16.0 g/dL    Hematocrit 34.5 (L) 36.0 - 46.0 %    MCV 84 80 - 100 fL    MCH 25.2 (L) 26.0 - 34.0 pg    MCHC 30.1 (L) 32.0 - 36.0 g/dL    RDW 14.5 11.5 - 14.5 %    Platelets 240 150 - 450 x10*3/uL   Comprehensive metabolic panel   Result Value Ref Range    Glucose 186 (H) 74 - 99 mg/dL    Sodium 137 136 - 145 mmol/L    Potassium 4.1 3.5 - 5.3 mmol/L    Chloride 103 98 - 107 mmol/L    Bicarbonate 28 21 - 32 mmol/L    Anion Gap 10 10 - 20 mmol/L    Urea Nitrogen 20 6 - 23 mg/dL    Creatinine 0.67 0.50 - 1.05 mg/dL    eGFR 83 >60 mL/min/1.73m*2    Calcium 9.0 8.6 - 10.3 mg/dL    Albumin 4.0 3.4 - 5.0 g/dL    Alkaline Phosphatase 64 33 - 136 U/L    Total Protein 6.7 6.4 - 8.2 g/dL    AST 11 9 - 39 U/L    Bilirubin, Total 0.7 0.0 - 1.2 mg/dL    ALT 8 7 - 45 U/L      constant

## 2023-12-03 NOTE — CARE PLAN
The patient's goals for the shift include increased comfort from the burning and frequency.    The clinical goals for the shift include Patient will have a decrease in redness and swelling to vaginal area by end of shift.      Problem: Pain  Goal: My pain/discomfort is manageable  Outcome: Progressing     Problem: Safety  Goal: Patient will be injury free during hospitalization  Outcome: Progressing  Goal: I will remain free of falls  Outcome: Progressing     Problem: Daily Care  Goal: Daily care needs are met  Outcome: Progressing     Problem: Psychosocial Needs  Goal: Demonstrates ability to cope with hospitalization/illness  Outcome: Progressing  Goal: Collaborate with me, my family, and caregiver to identify my specific goals  Outcome: Progressing     Problem: Discharge Barriers  Goal: My discharge needs are met  Outcome: Progressing     Problem: Infective UTI/pyelonephritis  Goal: Follows fluid restrictions or becomes normovolemic  Outcome: Progressing  Goal: Manages/understands urgency, continence  Outcome: Progressing  Goal: No signs of neurosensory, musculoskeletal, cardiac changes  Outcome: Progressing  Goal: No worsening signs of infection  Outcome: Progressing  Goal: Stable weight and I&O  Outcome: Progressing

## 2023-12-04 VITALS
SYSTOLIC BLOOD PRESSURE: 113 MMHG | HEART RATE: 74 BPM | DIASTOLIC BLOOD PRESSURE: 59 MMHG | RESPIRATION RATE: 18 BRPM | OXYGEN SATURATION: 97 % | HEIGHT: 60 IN | BODY MASS INDEX: 29.39 KG/M2 | TEMPERATURE: 96.8 F | WEIGHT: 149.69 LBS

## 2023-12-04 LAB
ERYTHROCYTE [DISTWIDTH] IN BLOOD BY AUTOMATED COUNT: 14.6 % (ref 11.5–14.5)
HCT VFR BLD AUTO: 33.1 % (ref 36–46)
HGB BLD-MCNC: 9.8 G/DL (ref 12–16)
MCH RBC QN AUTO: 24.8 PG (ref 26–34)
MCHC RBC AUTO-ENTMCNC: 29.6 G/DL (ref 32–36)
MCV RBC AUTO: 84 FL (ref 80–100)
NRBC BLD-RTO: 0 /100 WBCS (ref 0–0)
PLATELET # BLD AUTO: 203 X10*3/UL (ref 150–450)
RBC # BLD AUTO: 3.95 X10*6/UL (ref 4–5.2)
WBC # BLD AUTO: 6.8 X10*3/UL (ref 4.4–11.3)

## 2023-12-04 PROCEDURE — 36415 COLL VENOUS BLD VENIPUNCTURE: CPT | Performed by: OBSTETRICS & GYNECOLOGY

## 2023-12-04 PROCEDURE — 96372 THER/PROPH/DIAG INJ SC/IM: CPT | Performed by: OBSTETRICS & GYNECOLOGY

## 2023-12-04 PROCEDURE — 2500000002 HC RX 250 W HCPCS SELF ADMINISTERED DRUGS (ALT 637 FOR MEDICARE OP, ALT 636 FOR OP/ED): Mod: MUE | Performed by: OBSTETRICS & GYNECOLOGY

## 2023-12-04 PROCEDURE — 2500000001 HC RX 250 WO HCPCS SELF ADMINISTERED DRUGS (ALT 637 FOR MEDICARE OP): Performed by: OBSTETRICS & GYNECOLOGY

## 2023-12-04 PROCEDURE — 99232 SBSQ HOSP IP/OBS MODERATE 35: CPT | Performed by: NURSE PRACTITIONER

## 2023-12-04 PROCEDURE — 96366 THER/PROPH/DIAG IV INF ADDON: CPT

## 2023-12-04 PROCEDURE — 85027 COMPLETE CBC AUTOMATED: CPT | Performed by: OBSTETRICS & GYNECOLOGY

## 2023-12-04 PROCEDURE — 2500000004 HC RX 250 GENERAL PHARMACY W/ HCPCS (ALT 636 FOR OP/ED): Performed by: OBSTETRICS & GYNECOLOGY

## 2023-12-04 PROCEDURE — 99232 SBSQ HOSP IP/OBS MODERATE 35: CPT | Performed by: STUDENT IN AN ORGANIZED HEALTH CARE EDUCATION/TRAINING PROGRAM

## 2023-12-04 PROCEDURE — G0378 HOSPITAL OBSERVATION PER HR: HCPCS

## 2023-12-04 RX ORDER — ESOMEPRAZOLE MAGNESIUM 40 MG/1
40 GRANULE, DELAYED RELEASE ORAL
Status: DISCONTINUED | OUTPATIENT
Start: 2023-12-04 | End: 2023-12-04 | Stop reason: HOSPADM

## 2023-12-04 RX ORDER — DOCUSATE SODIUM 50 MG/5ML
100 LIQUID ORAL 2 TIMES DAILY
Status: DISCONTINUED | OUTPATIENT
Start: 2023-12-04 | End: 2023-12-04 | Stop reason: HOSPADM

## 2023-12-04 RX ORDER — NAPROXEN SODIUM 220 MG/1
81 TABLET, FILM COATED ORAL DAILY
Status: DISCONTINUED | OUTPATIENT
Start: 2023-12-04 | End: 2023-12-04 | Stop reason: HOSPADM

## 2023-12-04 RX ORDER — SULFAMETHOXAZOLE AND TRIMETHOPRIM 800; 160 MG/1; MG/1
160 TABLET ORAL 2 TIMES DAILY
Status: DISCONTINUED | OUTPATIENT
Start: 2023-12-04 | End: 2023-12-04 | Stop reason: HOSPADM

## 2023-12-04 RX ORDER — SULFAMETHOXAZOLE AND TRIMETHOPRIM 800; 160 MG/1; MG/1
1 TABLET ORAL 2 TIMES DAILY
Qty: 10 TABLET | Refills: 0 | Status: SHIPPED | OUTPATIENT
Start: 2023-12-04 | End: 2023-12-09

## 2023-12-04 RX ADMIN — METFORMIN HYDROCHLORIDE 850 MG: 850 TABLET ORAL at 16:05

## 2023-12-04 RX ADMIN — DOCUSATE SODIUM 100 MG: 100 CAPSULE, LIQUID FILLED ORAL at 10:05

## 2023-12-04 RX ADMIN — Medication 1 TABLET: at 10:04

## 2023-12-04 RX ADMIN — POLYETHYLENE GLYCOL 3350 17 G: 17 POWDER, FOR SOLUTION ORAL at 10:05

## 2023-12-04 RX ADMIN — HEPARIN SODIUM 5000 UNITS: 5000 INJECTION INTRAVENOUS; SUBCUTANEOUS at 10:05

## 2023-12-04 RX ADMIN — LISINOPRIL 10 MG: 10 TABLET ORAL at 10:05

## 2023-12-04 RX ADMIN — MELOXICAM 15 MG: 15 TABLET ORAL at 12:38

## 2023-12-04 RX ADMIN — VANCOMYCIN HYDROCHLORIDE 1 G: 1 INJECTION, SOLUTION INTRAVENOUS at 01:34

## 2023-12-04 RX ADMIN — METFORMIN HYDROCHLORIDE 850 MG: 850 TABLET ORAL at 10:07

## 2023-12-04 RX ADMIN — PIPERACILLIN SODIUM AND TAZOBACTAM SODIUM 3.38 G: 3; .375 INJECTION, SOLUTION INTRAVENOUS at 10:24

## 2023-12-04 RX ADMIN — ASPIRIN 81 MG: 81 TABLET, COATED ORAL at 10:05

## 2023-12-04 RX ADMIN — PAROXETINE 10 MG: 10 TABLET, FILM COATED ORAL at 10:05

## 2023-12-04 RX ADMIN — HEPARIN SODIUM 5000 UNITS: 5000 INJECTION INTRAVENOUS; SUBCUTANEOUS at 02:08

## 2023-12-04 RX ADMIN — PANTOPRAZOLE SODIUM 40 MG: 40 TABLET, DELAYED RELEASE ORAL at 06:01

## 2023-12-04 ASSESSMENT — COGNITIVE AND FUNCTIONAL STATUS - GENERAL
TURNING FROM BACK TO SIDE WHILE IN FLAT BAD: A LITTLE
DRESSING REGULAR UPPER BODY CLOTHING: A LITTLE
MOBILITY SCORE: 17
WALKING IN HOSPITAL ROOM: A LITTLE
PERSONAL GROOMING: A LITTLE
DRESSING REGULAR LOWER BODY CLOTHING: A LITTLE
HELP NEEDED FOR BATHING: A LITTLE
CLIMB 3 TO 5 STEPS WITH RAILING: A LOT
TOILETING: A LITTLE
STANDING UP FROM CHAIR USING ARMS: A LITTLE
MOVING TO AND FROM BED TO CHAIR: A LITTLE
MOVING FROM LYING ON BACK TO SITTING ON SIDE OF FLAT BED WITH BEDRAILS: A LITTLE
DAILY ACTIVITIY SCORE: 19

## 2023-12-04 ASSESSMENT — PAIN SCALES - GENERAL: PAINLEVEL_OUTOF10: 0 - NO PAIN

## 2023-12-04 ASSESSMENT — PAIN - FUNCTIONAL ASSESSMENT: PAIN_FUNCTIONAL_ASSESSMENT: 0-10

## 2023-12-04 NOTE — PROGRESS NOTES
Voice mail message left for daughterAnnette to return my call to discuss her mother's discharge plan.     1050: Return referral sent to the [facility] to determine if patient is long term resident     1320: Patient accepted to return to the [facility], confirmed with daughter Annette that patient is medically ready for discharge. jeff Mcknight, and supporting documentation sent electronically. Daughter will transport at 1600 today.

## 2023-12-04 NOTE — DISCHARGE SUMMARY
Discharge Summary    Admission Date: 12/2/2023  Discharge Date: 12/4/2024    Discharge Diagnosis  Vulvar cellulitis, UTI    Hospital Course  Patient was admitted to gyn with a diagnosis of vulvar cellulitis and acute cystitis. She was found to have a WBC of 13 but was otherwise afebrile and hemodynamically stable. She did not meet sepsis criteria while admitted. She was started on vanc/zosyn with resolution of her leukocytosis and significant improvement in her symptoms and physical exam. UCx grew >100K enteric bacilli. Speciation and sensitivities were pending at time of discharge and will be followed up outpatient. Medicine was consulted for assistance in managing her HTN and DM. She was transitioned to PO bactrim and discharged back to her long term care facility with plans to complete a 5 day course to cover for both cellulitis and UTI.     Pertinent Physical Exam At Time of Discharge  See progress note from day of discharge    Discharge Meds     Your medication list        START taking these medications        Instructions Last Dose Given Next Dose Due   sulfamethoxazole-trimethoprim 800-160 mg tablet  Commonly known as: Bactrim DS      Take 1 tablet by mouth 2 times a day for 5 days.              CONTINUE taking these medications        Instructions Last Dose Given Next Dose Due   aspirin 81 mg EC tablet           Calcium with Vitamin D 600 mg-10 mcg (400 unit) tablet  Generic drug: calcium carbonate-vitamin D3           docusate sodium 100 mg capsule  Commonly known as: Colace           lisinopril 10 mg tablet           meloxicam 15 mg tablet  Commonly known as: Mobic           metFORMIN 850 mg tablet  Commonly known as: Glucophage           omeprazole 40 mg DR capsule  Commonly known as: PriLOSEC           PARoxetine 10 mg tablet  Commonly known as: Paxil           polyethylene glycol 17 gram packet  Commonly known as: Glycolax, Miralax           simvastatin 20 mg tablet  Commonly known as: Zocor                      Where to Get Your Medications        These medications were sent to [address] [address] [address]  [id number] [address], [facility] [address]      Phone: [phone]   sulfamethoxazole-trimethoprim 800-160 mg tablet          Test Results Pending At Discharge  Pending Labs       Order Current Status    Extra Tubes Preliminary result    PST Top Preliminary result    Urine Culture Preliminary result            Outpatient Follow-Up  No future appointments.    I spent 30 minutes in the professional and overall care of this patient.      Gissell Cunha MD

## 2023-12-04 NOTE — CARE PLAN
The patient's goals for the shift include remain comfortable    The clinical goals for the shift include patient will have decreased redness and burning to jenna area      Problem: Pain  Goal: My pain/discomfort is manageable  Outcome: Progressing     Problem: Daily Care  Goal: Daily care needs are met  Outcome: Progressing     Problem: Safety  Goal: Patient will be injury free during hospitalization  Outcome: Progressing  Goal: I will remain free of falls  Outcome: Progressing     Problem: Psychosocial Needs  Goal: Demonstrates ability to cope with hospitalization/illness  Outcome: Progressing  Goal: Collaborate with me, my family, and caregiver to identify my specific goals  Outcome: Progressing     Problem: Discharge Barriers  Goal: My discharge needs are met  Outcome: Progressing     Problem: Skin  Goal: Decreased wound size/increased tissue granulation at next dressing change  Outcome: Progressing  Goal: Participates in plan/prevention/treatment measures  Outcome: Progressing  Goal: Prevent/manage excess moisture  Outcome: Progressing  Goal: Prevent/minimize sheer/friction injuries  Outcome: Progressing  Goal: Promote/optimize nutrition  Outcome: Progressing  Goal: Promote skin healing  Outcome: Progressing

## 2023-12-04 NOTE — CARE PLAN
The patient's goals for the shift include increased comfort from the burning and frequency    The clinical goals for the shift include Patient will have a decrease in redness and swelling to vaginal area by end of shift.    Patient continues IV Rocephin and IV Vanco. Patient has some relief to jenna area with Triad cream applied. Remains continent of urine and using BSC.

## 2023-12-04 NOTE — PROGRESS NOTES
Natacha Ragsdale is a 89 y.o. female on day 0 of admission presenting with Vulvar cellulitis.    Subjective   Patient states her pain is improved.  Still has some vulvar burning but much improved since admission overnight.  Remains afebrile.        Objective     Physical Exam    Last Recorded Vitals  Blood pressure 101/50, pulse 87, temperature 36.7 °C (98.1 °F), resp. rate 16, height 1.524 m (5'), weight 67.9 kg (149 lb 11.1 oz), SpO2 97 %.  Intake/Output last 3 Shifts:  I/O last 3 completed shifts:  In: 1640 (24.2 mL/kg) [P.O.:840; IV Piggyback:800]  Out: 500 (7.4 mL/kg) [Urine:500 (0.2 mL/kg/hr)]  Weight: 67.9 kg   Gen in NAD  Abdomen: soft, nontender, left inguinal hernia noted, reducible (has been present for many years per patients daughter   Pelvic: mild erythema of clitoral gaytan and upper bilateral labia, seems improved from overnight exam per notes, no e/o abscess    Relevant Results  Results for orders placed or performed during the hospital encounter of 12/02/23 (from the past 24 hour(s))   CBC and Auto Differential   Result Value Ref Range    WBC 13.3 (H) 4.4 - 11.3 x10*3/uL    nRBC 0.0 0.0 - 0.0 /100 WBCs    RBC 4.08 4.00 - 5.20 x10*6/uL    Hemoglobin 10.3 (L) 12.0 - 16.0 g/dL    Hematocrit 34.0 (L) 36.0 - 46.0 %    MCV 83 80 - 100 fL    MCH 25.2 (L) 26.0 - 34.0 pg    MCHC 30.3 (L) 32.0 - 36.0 g/dL    RDW 14.5 11.5 - 14.5 %    Platelets 229 150 - 450 x10*3/uL    Neutrophils % 80.0 40.0 - 80.0 %    Immature Granulocytes %, Automated 0.4 0.0 - 0.9 %    Lymphocytes % 11.2 13.0 - 44.0 %    Monocytes % 7.7 2.0 - 10.0 %    Eosinophils % 0.5 0.0 - 6.0 %    Basophils % 0.2 0.0 - 2.0 %    Neutrophils Absolute 10.62 (H) 1.60 - 5.50 x10*3/uL    Immature Granulocytes Absolute, Automated 0.05 0.00 - 0.50 x10*3/uL    Lymphocytes Absolute 1.48 0.80 - 3.00 x10*3/uL    Monocytes Absolute 1.02 (H) 0.05 - 0.80 x10*3/uL    Eosinophils Absolute 0.06 0.00 - 0.40 x10*3/uL    Basophils Absolute 0.02 0.00 - 0.10 x10*3/uL    Phosphorus   Result Value Ref Range    Phosphorus 4.4 2.5 - 4.9 mg/dL   Magnesium   Result Value Ref Range    Magnesium 2.23 1.60 - 2.40 mg/dL   Comprehensive metabolic panel   Result Value Ref Range    Glucose 146 (H) 74 - 99 mg/dL    Sodium 134 (L) 136 - 145 mmol/L    Potassium 5.4 (H) 3.5 - 5.3 mmol/L    Chloride 99 98 - 107 mmol/L    Bicarbonate 27 21 - 32 mmol/L    Anion Gap 13 10 - 20 mmol/L    Urea Nitrogen 32 (H) 6 - 23 mg/dL    Creatinine 0.80 0.50 - 1.05 mg/dL    eGFR 70 >60 mL/min/1.73m*2    Calcium 9.2 8.6 - 10.3 mg/dL    Albumin 4.1 3.4 - 5.0 g/dL    Alkaline Phosphatase 65 33 - 136 U/L    Total Protein 7.3 6.4 - 8.2 g/dL    AST 27 9 - 39 U/L    Bilirubin, Total 0.4 0.0 - 1.2 mg/dL    ALT 11 7 - 45 U/L   Lipase   Result Value Ref Range    Lipase 14 9 - 82 U/L   Lactate   Result Value Ref Range    Lactate 1.9 0.4 - 2.0 mmol/L   Urinalysis with Reflex Microscopic and Culture   Result Value Ref Range    Color, Urine Yellow Straw, Yellow    Appearance, Urine Clear Clear    Specific Gravity, Urine 1.008 1.005 - 1.035    pH, Urine 5.0 5.0, 5.5, 6.0, 6.5, 7.0, 7.5, 8.0    Protein, Urine NEGATIVE NEGATIVE mg/dL    Glucose, Urine NEGATIVE NEGATIVE mg/dL    Blood, Urine SMALL (1+) (A) NEGATIVE    Ketones, Urine NEGATIVE NEGATIVE mg/dL    Bilirubin, Urine NEGATIVE NEGATIVE    Urobilinogen, Urine <2.0 <2.0 mg/dL    Nitrite, Urine POSITIVE (A) NEGATIVE    Leukocyte Esterase, Urine TRACE (A) NEGATIVE   Extra Urine Gray Tube   Result Value Ref Range    Extra Tube Hold for add-ons.    Microscopic Only, Urine   Result Value Ref Range    WBC, Urine 1-5 1-5, NONE /HPF    RBC, Urine NONE NONE, 1-2, 3-5 /HPF    Bacteria, Urine 1+ (A) NONE SEEN /HPF   CBC   Result Value Ref Range    WBC 13.3 (H) 4.4 - 11.3 x10*3/uL    nRBC 0.0 0.0 - 0.0 /100 WBCs    RBC 4.13 4.00 - 5.20 x10*6/uL    Hemoglobin 10.4 (L) 12.0 - 16.0 g/dL    Hematocrit 34.5 (L) 36.0 - 46.0 %    MCV 84 80 - 100 fL    MCH 25.2 (L) 26.0 - 34.0 pg    MCHC 30.1 (L)  32.0 - 36.0 g/dL    RDW 14.5 11.5 - 14.5 %    Platelets 240 150 - 450 x10*3/uL   Comprehensive metabolic panel   Result Value Ref Range    Glucose 186 (H) 74 - 99 mg/dL    Sodium 137 136 - 145 mmol/L    Potassium 4.1 3.5 - 5.3 mmol/L    Chloride 103 98 - 107 mmol/L    Bicarbonate 28 21 - 32 mmol/L    Anion Gap 10 10 - 20 mmol/L    Urea Nitrogen 20 6 - 23 mg/dL    Creatinine 0.67 0.50 - 1.05 mg/dL    eGFR 83 >60 mL/min/1.73m*2    Calcium 9.0 8.6 - 10.3 mg/dL    Albumin 4.0 3.4 - 5.0 g/dL    Alkaline Phosphatase 64 33 - 136 U/L    Total Protein 6.7 6.4 - 8.2 g/dL    AST 11 9 - 39 U/L    Bilirubin, Total 0.7 0.0 - 1.2 mg/dL    ALT 8 7 - 45 U/L       Assessment/Plan   Principal Problem:    Vulvar cellulitis  Active Problems:    Acute cystitis      89 y/o presenting with vulvar cellulitis.  Clinically improving.  Continue vanc/zosyn.  Repeat CBC ordered for AM to show improvement in leukocytosis.  Medicine consulted for management of her HTN, DM. Will reassess for discharge tomorrow on PO antibiotics if continues to improve.      Joselyn Vasquez MD

## 2023-12-04 NOTE — PROGRESS NOTES
Natacha Ragsdale is a 89 y.o. female on day 0 of admission presenting with Vulvar cellulitis.    Subjective   History obtained using daughter Annette as interpretor as Slovak interpretor was not available. Patient reports feeling much better today. Burning and discomfort now resolved.        Objective     Physical Exam    Last Recorded Vitals  Blood pressure 113/59, pulse 74, temperature 36 °C (96.8 °F), temperature source Temporal, resp. rate 18, height 1.524 m (5'), weight 67.9 kg (149 lb 11.1 oz), SpO2 97 %.  Intake/Output last 3 Shifts:  I/O last 3 completed shifts:  In: 2420 (35.6 mL/kg) [P.O.:1320; IV Piggyback:1100]  Out: 500 (7.4 mL/kg) [Urine:500 (0.2 mL/kg/hr)]  Weight: 67.9 kg     Physical Exam  Gen: awake, alert  Head: NCAT  HEENT: moist mucus membranes  Pulm: breathing comfortably on room air  CV: warm and well-perfused  : vulvar erythema now resolved  Neuro: alert and oriented  Psych: appropriate affect     Relevant Results  Results for orders placed or performed during the hospital encounter of 12/02/23 (from the past 24 hour(s))   CBC   Result Value Ref Range    WBC 6.8 4.4 - 11.3 x10*3/uL    nRBC 0.0 0.0 - 0.0 /100 WBCs    RBC 3.95 (L) 4.00 - 5.20 x10*6/uL    Hemoglobin 9.8 (L) 12.0 - 16.0 g/dL    Hematocrit 33.1 (L) 36.0 - 46.0 %    MCV 84 80 - 100 fL    MCH 24.8 (L) 26.0 - 34.0 pg    MCHC 29.6 (L) 32.0 - 36.0 g/dL    RDW 14.6 (H) 11.5 - 14.5 %    Platelets 203 150 - 450 x10*3/uL   PST Top   Result Value Ref Range    Extra Tube Hold for add-ons.          Assessment/Plan   Principal Problem:    Vulvar cellulitis  Active Problems:    Acute cystitis    Controlled type 2 diabetes mellitus without complication, without long-term current use of insulin (CMS/[facility])    GERD (gastroesophageal reflux disease)    HLD (hyperlipidemia)    Sensorineural hearing loss, bilateral    Primary hypertension    89 y/o presenting with vulvar cellulitis and UTI. Clinically improving on vanc/zosyn. Afebrile, no tachycardia,  normotensive. Vulvar erythema resolved on exam today. Leukocytosis resolved. UCx growing enteric bacilli, speciation and sensitivities pending. To transition to PO bactrim today to complete 5 day course. Will follow-up UCx sensitivities and call if needing to adjust antibiotics. Medicine consulted for management of DM and HTN, appreciate recs. For discharge this afternoon.     I spent 30 minutes in the professional and overall care of this patient.      Gissell Cunha MD

## 2023-12-04 NOTE — PROGRESS NOTES
Spiritual Care Visit         Mandaeism Encounters  Mandaeism Needs: Prayer    Values/Beliefs  Cultural Requests During Hospitalization: Limited English, Kyrgyz    Sacramental Encounters  Sacrament of Sick-Anointing: Anointed          Offered patient prayer and the Mystery of Holy Anointing                   Taxonomy  Intended Effects: Establish rapport and connectedness  Methods: Offer spiritual/Congregational support  Interventions: Prayer for healing

## 2023-12-04 NOTE — PROGRESS NOTES
Vancomycin Dosing by Pharmacy- Cessation of Therapy    Consult to pharmacy for vancomycin dosing has been discontinued by the prescriber, pharmacy will sign off at this time.    Please call pharmacy if there are further questions or re-enter a consult if vancomycin is resumed.     Wilfredo Handley, AntoniaD

## 2023-12-04 NOTE — PROGRESS NOTES
Natacha Ragsdale is a 89 y.o. female on day 0 of admission presenting with Vulvar cellulitis.    Subjective   Patient seen this am  Attending at bedside  Patient indicates she mostly speeks Italian  Does speak some english  Pleasant, cooperative, NAD      Objective     Physical Exam  Constitutional:       General: She is not in acute distress.     Appearance: Normal appearance. She is not ill-appearing.   HENT:      Head: Normocephalic and atraumatic.   Cardiovascular:      Rate and Rhythm: Normal rate and regular rhythm.   Pulmonary:      Effort: Pulmonary effort is normal. No respiratory distress.      Breath sounds: Normal breath sounds.   Abdominal:      General: There is no distension.   Musculoskeletal:         General: No signs of injury.   Skin:     General: Skin is warm and dry.   Neurological:      General: No focal deficit present.      Mental Status: She is alert.   Psychiatric:         Mood and Affect: Mood normal.         Behavior: Behavior normal.  Last Recorded Vitals  Blood pressure 113/59, pulse 74, temperature 36 °C (96.8 °F), temperature source Temporal, resp. rate 18, height 1.524 m (5'), weight 67.9 kg (149 lb 11.1 oz), SpO2 97 %.  Intake/Output last 3 Shifts:  I/O last 3 completed shifts:  In: 2420 (35.6 mL/kg) [P.O.:1320; IV Piggyback:1100]  Out: 500 (7.4 mL/kg) [Urine:500 (0.2 mL/kg/hr)]  Weight: 67.9 kg     Relevant Results  Scheduled medications  aspirin, 81 mg, oral, Daily  calcium carbonate-vitamin D3, 1 tablet, oral, Daily  docusate sodium, 100 mg, oral, BID  esomeprazole, 40 mg, oral, Daily before breakfast  heparin (porcine), 5,000 Units, subcutaneous, q8h ANASTACIO  lisinopril, 10 mg, oral, Daily  meloxicam, 15 mg, oral, Daily  metFORMIN, 850 mg, oral, BID with meals  PARoxetine, 10 mg, oral, q AM  polyethylene glycol, 17 g, oral, Daily  simvastatin, 20 mg, oral, Nightly  sulfamethoxazole-trimethoprim, 160 mg, oral, BID      Continuous medications    PRN medications  PRN medications:  acetaminophen, ibuprofen, ondansetron ODT, oxyCODONE, oxyCODONE  Results from last 7 days   Lab Units 12/04/23  0613 12/03/23  0919 12/02/23  2040   WBC AUTO x10*3/uL 6.8 13.3* 13.3*   RBC AUTO x10*6/uL 3.95* 4.13 4.08   HEMOGLOBIN g/dL 9.8* 10.4* 10.3*     Results from last 7 days   Lab Units 12/03/23  0919 12/02/23  2040   SODIUM mmol/L 137 134*   POTASSIUM mmol/L 4.1 5.4*   CHLORIDE mmol/L 103 99   CO2 mmol/L 28 27   BUN mg/dL 20 32*   CREATININE mg/dL 0.67 0.80   CALCIUM mg/dL 9.0 9.2   PHOSPHORUS mg/dL  --  4.4   MAGNESIUM mg/dL  --  2.23   BILIRUBIN TOTAL mg/dL 0.7 0.4   ALT U/L 8 11   AST U/L 11 27       CT abdomen pelvis w IV contrast    Result Date: 12/2/2023  STUDY: CT Abdomen and Pelvis with IV Contrast; 12/2/2023 9:56 PM. INDICATION: Left lower quadrant abdominal pain.  Large hernia. COMPARISON: CT AP 10/18/2023. ACCESSION NUMBER(S): [id number] ORDERING CLINICIAN: James Alberto TECHNIQUE: CT of the abdomen and pelvis was performed.  Contiguous axial images were obtained at 3 mm slice thickness through the abdomen and pelvis. Coronal and sagittal reconstructions at 3 mm slice thickness were performed.  Omnipaque 350 75 mL was administered intravenously.  FINDINGS: Abdomen: The gallbladder is surgically absent with moderate dilatation of the common bile duct and intrahepatic bile ducts, minimally increased since prior exam. A small calculus in the distal CBD is not excluded (series 201, slice 59/151). No significant abnormalities are detectable in the liver or spleen. Pancreas demonstrates age-related atrophy. Adrenal glands are normal. Minimal scarring and a few small cysts are seen in the kidneys. There are no renal calculi and there is no hydronephrosis. There is no bulky lymphadenopathy or ascites. Abdominal aorta is mildly calcified without aneurysm. IVC is patent. The stomach is non-distended with a small hiatal hernia. Small bowel is normal caliber. Appendix is not seen. Moderate stool burden  distends the colon. There is diverticulosis without diverticulitis. There is no colitis, abscess, or free air. Pelvis: Adjacent hernias are seen in the left lower pelvis and inguinal region, with spigelian-type hernia containing fat, and adjacent moderate inguinal hernia containing small bowel. Small bowel loops in the inguinal hernia sac are minimally distended but not overtly obstructed (series 201, slice 132/151). Bladder is moderately distended. No deep pelvic mass or fluid collection is seen. Pelvic veins are patent. Skeletal: Mild arthritis is seen in the spine. No compression fractures are seen. There has been fracture surgery at the left proximal femur. Lower chest: Heart is minimally enlarged. There is minimal dependent atelectasis. There are no pleural effusions.    1. Adjacent hernias in the left lower pelvis and inguinal region, including moderate left inguinal hernia containing minimally distended but not overly obstructed small bowel. 2. Moderate stool distention colon. 3. Increasing moderate dilatation common bile duct and intrahepatic bile ducts following cholecystectomy; small distal CBD calculus not excluded. 4. Remainder as above. Signed by Sandeep Hernandez MD       Assessment/Plan   Principal Problem:    Vulvar cellulitis  Active Problems:    Acute cystitis    Controlled type 2 diabetes mellitus without complication, without long-term current use of insulin (CMS/[facility])    GERD (gastroesophageal reflux disease)    HLD (hyperlipidemia)    Sensorineural hearing loss, bilateral    Primary hypertension    Plan:    Admitted to OB GYN  Hospital medicine was consulted yesterday for medical management DM and HTN  alert though seems to drift back and forth between Estonian and English but does respond appropriately to questions  Vitals stable  Lisinopril resumed  Patient on metformin, resumed  No leukocytosis this am  Antibiotics on DC per OB GYN  Patient in DC status, hospital medicine will sign off    I spent >25  minutes in the professional and overall care of this patient.      Sonia Garcia, [facility]  [address] [address]  Phone: [phone] Fax: [phone]

## 2023-12-05 LAB — BACTERIA UR CULT: ABNORMAL

## 2023-12-09 ENCOUNTER — HOSPITAL ENCOUNTER (EMERGENCY)
Facility: HOSPITAL | Age: 88
Discharge: NURSING, REHAB OR LONG-TERM CARE | End: 2023-12-09
Attending: EMERGENCY MEDICINE
Payer: MEDICARE

## 2023-12-09 ENCOUNTER — APPOINTMENT (OUTPATIENT)
Dept: RADIOLOGY | Facility: HOSPITAL | Age: 88
End: 2023-12-09
Payer: MEDICARE

## 2023-12-09 VITALS
BODY MASS INDEX: 31.25 KG/M2 | TEMPERATURE: 97.2 F | WEIGHT: 160 LBS | OXYGEN SATURATION: 97 % | DIASTOLIC BLOOD PRESSURE: 53 MMHG | HEART RATE: 78 BPM | RESPIRATION RATE: 18 BRPM | SYSTOLIC BLOOD PRESSURE: 111 MMHG

## 2023-12-09 DIAGNOSIS — R31.9 HEMATURIA, UNSPECIFIED TYPE: Primary | ICD-10-CM

## 2023-12-09 LAB
ALBUMIN SERPL BCP-MCNC: 4 G/DL (ref 3.4–5)
ALP SERPL-CCNC: 64 U/L (ref 33–136)
ALT SERPL W P-5'-P-CCNC: 15 U/L (ref 7–45)
ANION GAP BLDV CALCULATED.4IONS-SCNC: 10 MMOL/L (ref 10–25)
ANION GAP SERPL CALC-SCNC: 13 MMOL/L (ref 10–20)
APPEARANCE UR: CLEAR
AST SERPL W P-5'-P-CCNC: 17 U/L (ref 9–39)
B-OH-BUTYR SERPL-SCNC: 0.11 MMOL/L (ref 0.02–0.27)
BASE EXCESS BLDV CALC-SCNC: 2.4 MMOL/L (ref -2–3)
BASOPHILS # BLD AUTO: 0.01 X10*3/UL (ref 0–0.1)
BASOPHILS NFR BLD AUTO: 0.2 %
BILIRUB SERPL-MCNC: 0.2 MG/DL (ref 0–1.2)
BILIRUB UR STRIP.AUTO-MCNC: NEGATIVE MG/DL
BODY TEMPERATURE: ABNORMAL
BUN SERPL-MCNC: 25 MG/DL (ref 6–23)
CA-I BLDV-SCNC: 1.21 MMOL/L (ref 1.1–1.33)
CALCIUM SERPL-MCNC: 9 MG/DL (ref 8.6–10.3)
CHLORIDE BLDV-SCNC: 102 MMOL/L (ref 98–107)
CHLORIDE SERPL-SCNC: 102 MMOL/L (ref 98–107)
CO2 SERPL-SCNC: 26 MMOL/L (ref 21–32)
COLOR UR: ABNORMAL
CREAT SERPL-MCNC: 0.88 MG/DL (ref 0.5–1.05)
EOSINOPHIL # BLD AUTO: 0.1 X10*3/UL (ref 0–0.4)
EOSINOPHIL NFR BLD AUTO: 2.1 %
ERYTHROCYTE [DISTWIDTH] IN BLOOD BY AUTOMATED COUNT: 14.7 % (ref 11.5–14.5)
GFR SERPL CREATININE-BSD FRML MDRD: 62 ML/MIN/1.73M*2
GLUCOSE BLDV-MCNC: 106 MG/DL (ref 74–99)
GLUCOSE SERPL-MCNC: 108 MG/DL (ref 74–99)
GLUCOSE UR STRIP.AUTO-MCNC: NEGATIVE MG/DL
HCO3 BLDV-SCNC: 27.2 MMOL/L (ref 22–26)
HCT VFR BLD AUTO: 32.7 % (ref 36–46)
HCT VFR BLD EST: 32 % (ref 36–46)
HGB BLD-MCNC: 9.9 G/DL (ref 12–16)
HGB BLDV-MCNC: 10.7 G/DL (ref 12–16)
HOLD SPECIMEN: NORMAL
HOLD SPECIMEN: NORMAL
IMM GRANULOCYTES # BLD AUTO: 0.02 X10*3/UL (ref 0–0.5)
IMM GRANULOCYTES NFR BLD AUTO: 0.4 % (ref 0–0.9)
INHALED O2 CONCENTRATION: 24 %
KETONES UR STRIP.AUTO-MCNC: NEGATIVE MG/DL
LACTATE BLDV-SCNC: 1 MMOL/L (ref 0.4–2)
LEUKOCYTE ESTERASE UR QL STRIP.AUTO: NEGATIVE
LIPASE SERPL-CCNC: 10 U/L (ref 9–82)
LYMPHOCYTES # BLD AUTO: 0.77 X10*3/UL (ref 0.8–3)
LYMPHOCYTES NFR BLD AUTO: 16.3 %
MAGNESIUM SERPL-MCNC: 1.98 MG/DL (ref 1.6–2.4)
MCH RBC QN AUTO: 25 PG (ref 26–34)
MCHC RBC AUTO-ENTMCNC: 30.3 G/DL (ref 32–36)
MCV RBC AUTO: 83 FL (ref 80–100)
MONOCYTES # BLD AUTO: 0.72 X10*3/UL (ref 0.05–0.8)
MONOCYTES NFR BLD AUTO: 15.3 %
NEUTROPHILS # BLD AUTO: 3.09 X10*3/UL (ref 1.6–5.5)
NEUTROPHILS NFR BLD AUTO: 65.7 %
NITRITE UR QL STRIP.AUTO: NEGATIVE
NRBC BLD-RTO: 0 /100 WBCS (ref 0–0)
OXYHGB MFR BLDV: 55.3 % (ref 45–75)
PCO2 BLDV: 42 MM HG (ref 41–51)
PH BLDV: 7.42 PH (ref 7.33–7.43)
PH UR STRIP.AUTO: 7 [PH]
PLATELET # BLD AUTO: 220 X10*3/UL (ref 150–450)
PO2 BLDV: 37 MM HG (ref 35–45)
POTASSIUM BLDV-SCNC: 4.8 MMOL/L (ref 3.5–5.3)
POTASSIUM SERPL-SCNC: 4.5 MMOL/L (ref 3.5–5.3)
PROT SERPL-MCNC: 6.7 G/DL (ref 6.4–8.2)
PROT UR STRIP.AUTO-MCNC: NEGATIVE MG/DL
RBC # BLD AUTO: 3.96 X10*6/UL (ref 4–5.2)
RBC # UR STRIP.AUTO: ABNORMAL /UL
RBC #/AREA URNS AUTO: ABNORMAL /HPF
SAO2 % BLDV: 56 % (ref 45–75)
SODIUM BLDV-SCNC: 134 MMOL/L (ref 136–145)
SODIUM SERPL-SCNC: 136 MMOL/L (ref 136–145)
SP GR UR STRIP.AUTO: 1.01
UROBILINOGEN UR STRIP.AUTO-MCNC: <2 MG/DL
WBC # BLD AUTO: 4.7 X10*3/UL (ref 4.4–11.3)
WBC #/AREA URNS AUTO: ABNORMAL /HPF

## 2023-12-09 PROCEDURE — 82010 KETONE BODYS QUAN: CPT

## 2023-12-09 PROCEDURE — 83690 ASSAY OF LIPASE: CPT

## 2023-12-09 PROCEDURE — 82330 ASSAY OF CALCIUM: CPT

## 2023-12-09 PROCEDURE — 74177 CT ABD & PELVIS W/CONTRAST: CPT

## 2023-12-09 PROCEDURE — 99285 EMERGENCY DEPT VISIT HI MDM: CPT | Performed by: EMERGENCY MEDICINE

## 2023-12-09 PROCEDURE — 96375 TX/PRO/DX INJ NEW DRUG ADDON: CPT

## 2023-12-09 PROCEDURE — 96374 THER/PROPH/DIAG INJ IV PUSH: CPT

## 2023-12-09 PROCEDURE — 83735 ASSAY OF MAGNESIUM: CPT

## 2023-12-09 PROCEDURE — 84132 ASSAY OF SERUM POTASSIUM: CPT

## 2023-12-09 PROCEDURE — 2500000004 HC RX 250 GENERAL PHARMACY W/ HCPCS (ALT 636 FOR OP/ED)

## 2023-12-09 PROCEDURE — 2550000001 HC RX 255 CONTRASTS: Performed by: EMERGENCY MEDICINE

## 2023-12-09 PROCEDURE — 87086 URINE CULTURE/COLONY COUNT: CPT | Performed by: EMERGENCY MEDICINE

## 2023-12-09 PROCEDURE — 36415 COLL VENOUS BLD VENIPUNCTURE: CPT

## 2023-12-09 PROCEDURE — 81001 URINALYSIS AUTO W/SCOPE: CPT

## 2023-12-09 PROCEDURE — 85025 COMPLETE CBC W/AUTO DIFF WBC: CPT

## 2023-12-09 PROCEDURE — 74177 CT ABD & PELVIS W/CONTRAST: CPT | Performed by: STUDENT IN AN ORGANIZED HEALTH CARE EDUCATION/TRAINING PROGRAM

## 2023-12-09 PROCEDURE — 96361 HYDRATE IV INFUSION ADD-ON: CPT

## 2023-12-09 PROCEDURE — 99284 EMERGENCY DEPT VISIT MOD MDM: CPT | Mod: 25 | Performed by: EMERGENCY MEDICINE

## 2023-12-09 RX ORDER — ONDANSETRON HYDROCHLORIDE 2 MG/ML
4 INJECTION, SOLUTION INTRAVENOUS ONCE
Status: COMPLETED | OUTPATIENT
Start: 2023-12-09 | End: 2023-12-09

## 2023-12-09 RX ORDER — MORPHINE SULFATE 2 MG/ML
2 INJECTION, SOLUTION INTRAMUSCULAR; INTRAVENOUS ONCE
Status: COMPLETED | OUTPATIENT
Start: 2023-12-09 | End: 2023-12-09

## 2023-12-09 RX ADMIN — MORPHINE SULFATE 2 MG: 2 INJECTION, SOLUTION INTRAMUSCULAR; INTRAVENOUS at 12:19

## 2023-12-09 RX ADMIN — ONDANSETRON 4 MG: 2 INJECTION INTRAMUSCULAR; INTRAVENOUS at 12:17

## 2023-12-09 RX ADMIN — IOHEXOL 75 ML: 350 INJECTION, SOLUTION INTRAVENOUS at 12:59

## 2023-12-09 RX ADMIN — SODIUM CHLORIDE 500 ML: 9 INJECTION, SOLUTION INTRAVENOUS at 11:20

## 2023-12-09 ASSESSMENT — PAIN DESCRIPTION - PROGRESSION: CLINICAL_PROGRESSION: NOT CHANGED

## 2023-12-09 ASSESSMENT — LIFESTYLE VARIABLES
REASON UNABLE TO ASSESS: NO
HAVE PEOPLE ANNOYED YOU BY CRITICIZING YOUR DRINKING: NO
HAVE YOU EVER FELT YOU SHOULD CUT DOWN ON YOUR DRINKING: NO
EVER HAD A DRINK FIRST THING IN THE MORNING TO STEADY YOUR NERVES TO GET RID OF A HANGOVER: NO
EVER FELT BAD OR GUILTY ABOUT YOUR DRINKING: NO

## 2023-12-09 ASSESSMENT — PAIN DESCRIPTION - FREQUENCY: FREQUENCY: CONSTANT/CONTINUOUS

## 2023-12-09 ASSESSMENT — PAIN DESCRIPTION - ONSET: ONSET: SUDDEN

## 2023-12-09 ASSESSMENT — PAIN SCALES - GENERAL
PAINLEVEL_OUTOF10: 4
PAINLEVEL_OUTOF10: 3

## 2023-12-09 ASSESSMENT — PAIN - FUNCTIONAL ASSESSMENT
PAIN_FUNCTIONAL_ASSESSMENT: 0-10
PAIN_FUNCTIONAL_ASSESSMENT: 0-10

## 2023-12-09 ASSESSMENT — PAIN DESCRIPTION - LOCATION: LOCATION: ABDOMEN

## 2023-12-09 ASSESSMENT — PAIN DESCRIPTION - DESCRIPTORS: DESCRIPTORS: DISCOMFORT

## 2023-12-09 NOTE — ED PROVIDER NOTES
HPI: The patient is a 89-year-old female presenting with hematuria.  Is reported by her rehab staff.  She reportedly had blood in the toilet bowl.  They thought it was coming from her urethra.  Patient's daughter thinks it may be coming from her known hemorrhoids.  At presentation, patient is complaining of burning around her genitourinary area.  She was admitted here last week for vulvar cellulitis and urinary tract infection.  She does finished a course of Bactrim yesterday.  At presentation, she is denying fever, chills, chest pain, shortness of breath, nausea, vomiting.    Review of Systems:  CONSTITUTIONAL: Denies fever, sweats, chills.  NEURO: Denies difficulty walking, numbness, weakness, tingling, headache.  HEENT: Denies sore throat, rhinorrhea, epistaxis, changes in vision.  CARDIO: Denies chest pain, palpitations.  PULM: Denies shortness of breath, cough.  GI: Denies abdominal pain, nausea, vomiting, diarrhea, constipation, melena, hematochezia.  : Reports painful urination, frequency, hematuria.  MSK: Denies recent trauma.  SKIN: Denies rash, lesions.  ENDOCRINE: Denies unexpected weight-loss.  HEME: Denies bleeding disorder.  ------------------------------------------------------------  Past medical history: DM2, HLD, HTN, GERD  Past surgical history: Reviewed  Social history: Denies tobacco use, alcohol use, recreational drug usage  ALLERGIES: As documented in EMR were reviewed and discussed with patient.  ------------------------------------------------------------  Physical exam:  VS: As documented in the triage note from today's date and EMR flowsheet were reviewed.  Gen: No acute distress. Seated in bed.  Skin: Warm. Dry. Intact. No rashes or lesions.  Eyes: Pupils equally round and reactive to light. Clear sclera. EOMI.  HENT: Atraumatic appearance. Mucosal membranes moist.  CV: Regular rate and rhythm. S1, S2. No pedal edema. Warm extremities.  Resp: Nonlabored breathing Clear to auscultation  bilaterally.  GI: Soft and nontender. No rebound or guarding.  : Erythema and pain to palpation around the vulva.  MSK: Symmetric muscle bulk. No joint swelling in the extremities.  Neuro: Alert. Speech fluent. Moving all extremities. No focal deficits  Psych: Appropriate. Kempt.  ------------------------------------------------------------  Hospital Course / Medical Decision Making:  History obtained with the patient and her daughter.  Records including labs, imaging, notes reviewed.  Presentation concerning for possible undertreated UTI and/or ongoing cellulitis of the vulva.  Urinalysis with blood only, no evidence of ongoing UTI.  Given her diabetes, BHB and lipase were obtained which were within normal limits.  VBG unremarkable.  No evidence of DKA.  CBC was stable chronic anemia hemoglobin 9.9.  No leukocytosis.  CMP unremarkable.  Repeat CT scan demonstrated no acute findings.  Patient symptoms were controlled with 1/2 L fluid bolus, morphine, Zofran.  She remained hemodynamically stable and asymptomatic afterwards.  Case was discussed at length with the patient and her daughter.  Daughter had not wanted her mother brought to the emergency department in the first place.  Blood is likely residual from the inflammation from her recent UTI.  Nothing else to treat.  Patient is subsequently discharged back to her nursing home in satisfactory condition.  All questions answered and return precautions discussed.    I reviewed the case with the attending ED physician. The attending ED physician agrees with the plan. Patient was counseled regarding labs, imaging, likely diagnosis, and plan. All questions were answered.     James Alberto MD  Resident  12/09/23 2026

## 2023-12-09 NOTE — DISCHARGE INSTRUCTIONS
Seek immediate medical attention if you develop:  worsening blood in the urine, abdominal pain, new or worsening nausea, new or worsening vomiting, new or worsening diarrhea, chest pain, shortness of breath, pain with urination, problems urinating, fever, chills, weakness, or any new or worsening symptoms.

## 2023-12-11 LAB — BACTERIA UR CULT: NO GROWTH

## 2023-12-13 LAB — HOLD SPECIMEN: NORMAL
